# Patient Record
Sex: MALE | Race: WHITE | NOT HISPANIC OR LATINO | Employment: OTHER | URBAN - METROPOLITAN AREA
[De-identification: names, ages, dates, MRNs, and addresses within clinical notes are randomized per-mention and may not be internally consistent; named-entity substitution may affect disease eponyms.]

---

## 2017-07-11 ENCOUNTER — OFFICE VISIT (OUTPATIENT)
Dept: FAMILY MEDICINE | Facility: CLINIC | Age: 67
End: 2017-07-11
Payer: COMMERCIAL

## 2017-07-11 VITALS
RESPIRATION RATE: 20 BRPM | WEIGHT: 195 LBS | OXYGEN SATURATION: 97 % | HEART RATE: 74 BPM | SYSTOLIC BLOOD PRESSURE: 148 MMHG | TEMPERATURE: 97.9 F | DIASTOLIC BLOOD PRESSURE: 76 MMHG | BODY MASS INDEX: 27.3 KG/M2 | HEIGHT: 71 IN

## 2017-07-11 DIAGNOSIS — E78.5 HYPERLIPIDEMIA LDL GOAL <100: ICD-10-CM

## 2017-07-11 DIAGNOSIS — Z52.008 BLOOD DONOR: ICD-10-CM

## 2017-07-11 DIAGNOSIS — Z79.1 NSAID LONG-TERM USE: ICD-10-CM

## 2017-07-11 DIAGNOSIS — I10 HYPERTENSION GOAL BP (BLOOD PRESSURE) < 140/80: Primary | ICD-10-CM

## 2017-07-11 DIAGNOSIS — D50.0 IRON DEFICIENCY ANEMIA DUE TO CHRONIC BLOOD LOSS: ICD-10-CM

## 2017-07-11 DIAGNOSIS — N40.0 PROSTATE ENLARGEMENT: ICD-10-CM

## 2017-07-11 LAB
BASOPHILS # BLD AUTO: 0 10E9/L (ref 0–0.2)
BASOPHILS NFR BLD AUTO: 0.2 %
DIFFERENTIAL METHOD BLD: NORMAL
EOSINOPHIL # BLD AUTO: 0.1 10E9/L (ref 0–0.7)
EOSINOPHIL NFR BLD AUTO: 1.7 %
ERYTHROCYTE [DISTWIDTH] IN BLOOD BY AUTOMATED COUNT: 12.4 % (ref 10–15)
HCT VFR BLD AUTO: 42.7 % (ref 40–53)
HGB BLD-MCNC: 14.8 G/DL (ref 13.3–17.7)
LYMPHOCYTES # BLD AUTO: 1.5 10E9/L (ref 0.8–5.3)
LYMPHOCYTES NFR BLD AUTO: 25.2 %
MCH RBC QN AUTO: 33 PG (ref 26.5–33)
MCHC RBC AUTO-ENTMCNC: 34.7 G/DL (ref 31.5–36.5)
MCV RBC AUTO: 95 FL (ref 78–100)
MONOCYTES # BLD AUTO: 0.4 10E9/L (ref 0–1.3)
MONOCYTES NFR BLD AUTO: 6.8 %
NEUTROPHILS # BLD AUTO: 3.9 10E9/L (ref 1.6–8.3)
NEUTROPHILS NFR BLD AUTO: 66.1 %
PLATELET # BLD AUTO: 163 10E9/L (ref 150–450)
RBC # BLD AUTO: 4.48 10E12/L (ref 4.4–5.9)
WBC # BLD AUTO: 5.9 10E9/L (ref 4–11)

## 2017-07-11 PROCEDURE — 36415 COLL VENOUS BLD VENIPUNCTURE: CPT | Performed by: INTERNAL MEDICINE

## 2017-07-11 PROCEDURE — 82728 ASSAY OF FERRITIN: CPT | Performed by: INTERNAL MEDICINE

## 2017-07-11 PROCEDURE — 85025 COMPLETE CBC W/AUTO DIFF WBC: CPT | Performed by: INTERNAL MEDICINE

## 2017-07-11 PROCEDURE — 80053 COMPREHEN METABOLIC PANEL: CPT | Performed by: INTERNAL MEDICINE

## 2017-07-11 PROCEDURE — 80061 LIPID PANEL: CPT | Performed by: INTERNAL MEDICINE

## 2017-07-11 PROCEDURE — 99214 OFFICE O/P EST MOD 30 MIN: CPT | Performed by: INTERNAL MEDICINE

## 2017-07-11 RX ORDER — SIMVASTATIN 20 MG
20 TABLET ORAL AT BEDTIME
Qty: 90 TABLET | Refills: 3 | Status: SHIPPED | OUTPATIENT
Start: 2017-07-11 | End: 2018-07-10

## 2017-07-11 RX ORDER — AMLODIPINE AND BENAZEPRIL HYDROCHLORIDE 5; 20 MG/1; MG/1
1 CAPSULE ORAL DAILY
Qty: 90 CAPSULE | Refills: 3 | Status: SHIPPED | OUTPATIENT
Start: 2017-07-11 | End: 2018-06-28

## 2017-07-11 NOTE — PROGRESS NOTES
"  SUBJECTIVE:                                                    Herminio Wesley is a 66 year old male who presents to clinic today for the following health issues:      Hyperlipidemia Follow-Up      Rate your low fat/cholesterol diet?: good    Taking statin?  Yes, no muscle aches from statin    Other lipid medications/supplements?:  Fish oil/Omega 3, dose 1200 without side effects    Hypertension Follow-up      Outpatient blood pressures are being checked at home.  Results are all < 140. .    Low Salt Diet: no added salt      Amount of exercise or physical activity: golfing    Problems taking medications regularly: No    Medication side effects: none    Diet: low salt and low fat/cholesterol    Has been checking his BP over the past 2 wks.                        Re: prostate, takes ibuprofen 400 mg in the AM; to lessen prostate inflammation.              Still donates blood approx twice per year.                 Takes occas iron.                  Problem list and histories reviewed & adjusted, as indicated.      Patient Active Problem List    Diagnosis Date Noted     Amaurosis fugax of left eye 07/11/2013     Priority: High     In 2006; Carotid US mild plaque; PERNELL neg except for \"small aortic valve strands\" of uncertain significance; no further sx. He continue ASA 81 mg per day with a statin and BP meds       Hyperlipidemia LDL goal <100 07/10/2013     Priority: High     Hypertension goal BP (blood pressure) < 140/80 07/10/2013     Priority: High     Iron deficiency anemia due to chronic blood loss 07/13/2016     Priority: Medium     Blood donor 07/21/2015     Priority: Medium     On a regular basis; approx Q 3 months      (ferritin 21 in 7/15; add Fe) (14,4 and 37 in 7/16)       Prostate enlargement 07/11/2013     Priority: Medium     bx neg in 2004; Dr. Segura.          In 2009, PSA went up to 7.7, then down to 2.3, and pt decided to forgo future PSA checks.  Up to 8.31 several wks after a UTI; see Dr. Mars's " "note; then down to 6.6 in 9/13 with 8% free; he planned further f/u ; MRI of prostate \"ok\";  PSA = 8 in 7/14; continue to monitor;           7 in 4/15; see urology note; recheck in 6 months               PER PT, Down to 6 or so with NSAID Rx in 11/15/              History of UTI 07/11/2013     Priority: Medium     Advanced directives, counseling/discussion (ACP) 07/15/2014     Priority: Low     Advance Care Planning:   ACP Review and Resources Provided:  Reviewed chart for advance care plan.  Herminio Wesley has no plan or code status on file however states presence of ACP document. Copy requested. Added by Mary Osorio on 7/23/2014                 Preventive measure 07/10/2013     Priority: Low     APRIMA DATA BASE UNDER THE 7/10/13 NOTE  Colonoscopy neg in 5/14; recheck in 2019         History reviewed. No pertinent surgical history.  Social History     Social History     Marital status:      Spouse name: N/A     Number of children: N/A     Years of education: N/A     Occupational History     computers; [employer] Retired     Social History Main Topics     Smoking status: Never Smoker     Smokeless tobacco: Never Used     Alcohol use Yes      Comment: occ.     Drug use: No     Sexual activity: Yes     Partners: Female     Other Topics Concern     Parent/Sibling W/ Cabg, Mi Or Angioplasty Before 65f 55m? No     Social History Narrative         Current Outpatient Prescriptions   Medication Sig Dispense Refill     ibuprofen (ADVIL,MOTRIN) 200 MG tablet Take 400 mg by mouth every 4 hours as needed for mild pain       amLODIPine-benazepril (LOTREL) 5-20 MG per capsule Take 1 capsule by mouth daily 90 capsule 3     simvastatin (ZOCOR) 20 MG tablet Take 1 tablet (20 mg) by mouth At Bedtime 90 tablet 3     omega-3 fatty acids (FISH OIL) 1200 MG capsule Take 1 capsule by mouth daily.       Coenzyme Q10 (CO Q 10 PO) Take 1 tablet by mouth daily.       Cholecalciferol (VITAMIN D) 1000 UNITS capsule Take 1 capsule by " "mouth daily.       aspirin 81 MG tablet Take 1 tablet by mouth daily.       Allergies   Allergen Reactions     Penicillins Rash     BP Readings from Last 3 Encounters:   07/11/17 150/80   07/13/16 140/80   07/21/15 138/70    Wt Readings from Last 3 Encounters:   07/11/17 195 lb (88.5 kg)   07/13/16 192 lb (87.1 kg)   07/21/15 195 lb (88.5 kg)                    Reviewed and updated as needed this visit by clinical staff  Tobacco  Allergies  Meds  Med Hx  Surg Hx  Fam Hx  Soc Hx      Reviewed and updated as needed this visit by Provider         ROS:  CONSTITUTIONAL:NEGATIVE for fever, chills, change in weight  EYES: NEGATIVE for vision changes or irritation  RESP:NEGATIVE for significant cough or SOB  CV: NEGATIVE for chest pain, palpitations or peripheral edema  GI: NEGATIVE for hematochezia, melena, nausea and poor appetite  : negative for dysuria, hematuria    OBJECTIVE:                                                    /80 (BP Location: Left arm, Patient Position: Chair, Cuff Size: Adult Regular)  Pulse 74  Temp 97.9  F (36.6  C)  Resp 20  Ht 5' 11\" (1.803 m)  Wt 195 lb (88.5 kg)  SpO2 97%  BMI 27.2 kg/m2  Body mass index is 27.2 kg/(m^2).  GENERAL APPEARANCE: healthy, alert and no distress  NECK: no adenopathy, no asymmetry, masses, or scars and thyroid normal to palpation  RESP: lungs clear to auscultation - no rales, rhonchi or wheezes  CV: regular rates and rhythm, normal S1 S2, no S3 or S4 and no murmur, click or rub   (male): prostate 1+ BPH, no nodules    Diagnostic test results:  pending     ASSESSMENT/PLAN:                                                        ICD-10-CM    1. Hypertension goal BP (blood pressure) < 140/80 I10 amLODIPine-benazepril (LOTREL) 5-20 MG per capsule     Comprehensive metabolic panel (BMP + Alb, Alk Phos, ALT, AST, Total. Bili, TP)   2. Hyperlipidemia LDL goal <100 E78.5 Lipid Profile (Chol, Trig, HDL, LDL calc)     simvastatin (ZOCOR) 20 MG tablet   3. " Blood donor Z52.008 Ferritin     CBC with platelets and differential   4. Iron deficiency anemia due to chronic blood loss D50.0 Ferritin     CBC with platelets and differential   5. NSAID long-term use Z79.1 Comprehensive metabolic panel (BMP + Alb, Alk Phos, ALT, AST, Total. Bili, TP)   6. Prostate enlargement N40.0        Summary and implications:  We reviewed multiple issues.                    He is careful to not take ibuprofen and ASA together.                         Check labs and adjust medications as indicated.    Patient Instructions   I will let you know your lab results.   Keep checking your blood pressure.                                 Adeel Walker MD  [facility]   Results for orders placed or performed in visit on 07/11/17   Comprehensive metabolic panel (BMP + Alb, Alk Phos, ALT, AST, Total. Bili, TP)   Result Value Ref Range    Sodium 136 133 - 144 mmol/L    Potassium 4.5 3.4 - 5.3 mmol/L    Chloride 104 94 - 109 mmol/L    Carbon Dioxide 23 20 - 32 mmol/L    Anion Gap 9 3 - 14 mmol/L    Glucose 97 70 - 99 mg/dL    Urea Nitrogen 20 7 - 30 mg/dL    Creatinine 0.82 0.66 - 1.25 mg/dL    GFR Estimate >90  Non  GFR Calc   >60 mL/min/1.7m2    GFR Estimate If Black >90   GFR Calc   >60 mL/min/1.7m2    Calcium 9.3 8.5 - 10.1 mg/dL    Bilirubin Total 0.7 0.2 - 1.3 mg/dL    Albumin 4.4 3.4 - 5.0 g/dL    Protein Total 7.8 6.8 - 8.8 g/dL    Alkaline Phosphatase 60 40 - 150 U/L    ALT 29 0 - 70 U/L    AST 19 0 - 45 U/L   Lipid Profile (Chol, Trig, HDL, LDL calc)   Result Value Ref Range    Cholesterol 170 <200 mg/dL    Triglycerides 48 <150 mg/dL    HDL Cholesterol 87 >39 mg/dL    LDL Cholesterol Calculated 73 <100 mg/dL    Non HDL Cholesterol 83 <130 mg/dL   Ferritin   Result Value Ref Range    Ferritin 40 26 - 388 ng/mL   CBC with platelets and differential   Result Value Ref Range    WBC 5.9 4.0 - 11.0 10e9/L    RBC Count 4.48 4.4 - 5.9  10e12/L    Hemoglobin 14.8 13.3 - 17.7 g/dL    Hematocrit 42.7 40.0 - 53.0 %    MCV 95 78 - 100 fl    MCH 33.0 26.5 - 33.0 pg    MCHC 34.7 31.5 - 36.5 g/dL    RDW 12.4 10.0 - 15.0 %    Platelet Count 163 150 - 450 10e9/L    Diff Method Automated Method     % Neutrophils 66.1 %    % Lymphocytes 25.2 %    % Monocytes 6.8 %    % Eosinophils 1.7 %    % Basophils 0.2 %    Absolute Neutrophil 3.9 1.6 - 8.3 10e9/L    Absolute Lymphocytes 1.5 0.8 - 5.3 10e9/L    Absolute Monocytes 0.4 0.0 - 1.3 10e9/L    Absolute Eosinophils 0.1 0.0 - 0.7 10e9/L    Absolute Basophils 0.0 0.0 - 0.2 10e9/L     Letter sent.               Your lab results are normal,including the liver,kidney,glucose,bone marrow,cholesterol,and the iron level (ferritin) is better.      Keep taking the same medications,including the iron.

## 2017-07-11 NOTE — LETTER
[facility]  [address]  [address]  [place] [address] [phone]                                                                                                           Herm[place]io Wesley  [address] [address]    July 13, 2017      Dear Herminio,    The results of your recent tests were reviewed and are enclosed.         Your lab results are normal,including the liver,kidney,glucose,bone marrow,cholesterol,and the iron level (ferritin) is better.      Keep taking the same medications,including the iron.       Thank you for choosing [facility].  We appreciate the opportunity to serve you and look forward to supporting your healthcare needs in the future.    If you have any questions or concerns, please call me or my staff at [phone].      Sincerely,    Adeel Walker MD    Results for orders placed or performed in visit on 07/11/17   Comprehensive metabolic panel (BMP + Alb, Alk Phos, ALT, AST, Total. Bili, TP)   Result Value Ref Range    Sodium 136 133 - 144 mmol/L    Potassium 4.5 3.4 - 5.3 mmol/L    Chloride 104 94 - 109 mmol/L    Carbon Dioxide 23 20 - 32 mmol/L    Anion Gap 9 3 - 14 mmol/L    Glucose 97 70 - 99 mg/dL    Urea Nitrogen 20 7 - 30 mg/dL    Creatinine 0.82 0.66 - 1.25 mg/dL    GFR Estimate >90  Non  GFR Calc   >60 mL/min/1.7m2    GFR Estimate If Black >90   GFR Calc   >60 mL/min/1.7m2    Calcium 9.3 8.5 - 10.1 mg/dL    Bilirubin Total 0.7 0.2 - 1.3 mg/dL    Albumin 4.4 3.4 - 5.0 g/dL    Protein Total 7.8 6.8 - 8.8 g/dL    Alkaline Phosphatase 60 40 - 150 U/L    ALT 29 0 - 70 U/L    AST 19 0 - 45 U/L   Lipid Profile (Chol, Trig, HDL, LDL calc)   Result Value Ref Range    Cholesterol 170 <200 mg/dL    Triglycerides 48 <150 mg/dL    HDL Cholesterol 87 >39 mg/dL    LDL Cholesterol Calculated 73 <100 mg/dL    Non HDL Cholesterol 83 <130 mg/dL   Ferritin   Result Value Ref  Range    Ferritin 40 26 - 388 ng/mL   CBC with platelets and differential   Result Value Ref Range    WBC 5.9 4.0 - 11.0 10e9/L    RBC Count 4.48 4.4 - 5.9 10e12/L    Hemoglobin 14.8 13.3 - 17.7 g/dL    Hematocrit 42.7 40.0 - 53.0 %    MCV 95 78 - 100 fl    MCH 33.0 26.5 - 33.0 pg    MCHC 34.7 31.5 - 36.5 g/dL    RDW 12.4 10.0 - 15.0 %    Platelet Count 163 150 - 450 10e9/L    Diff Method Automated Method     % Neutrophils 66.1 %    % Lymphocytes 25.2 %    % Monocytes 6.8 %    % Eosinophils 1.7 %    % Basophils 0.2 %    Absolute Neutrophil 3.9 1.6 - 8.3 10e9/L    Absolute Lymphocytes 1.5 0.8 - 5.3 10e9/L    Absolute Monocytes 0.4 0.0 - 1.3 10e9/L    Absolute Eosinophils 0.1 0.0 - 0.7 10e9/L    Absolute Basophils 0.0 0.0 - 0.2 10e9/L

## 2017-07-11 NOTE — MR AVS SNAPSHOT
"              After Visit Summary   7/11/2017    Herminio Wesley    MRN: [id number]           Patient Information     Date Of Birth          1950        Visit Information        Provider Department      7/11/2017 10:15 AM Adeel Walker MD [facility]        Today's Diagnoses     Hypertension goal BP (blood pressure) < 140/80    -  1    Hyperlipidemia LDL goal <100        Blood donor        Iron deficiency anemia due to chronic blood loss        NSAID long-term use          Care Instructions    I will let you know your lab results.   Keep checking your blood pressure.                                     Follow-ups after your visit        Who to contact     If you have questions or need follow up information about today's clinic visit or your schedule please contact [facility] directly at [phone].  Normal or non-critical lab and imaging results will be communicated to you by MyChart, letter or phone within 4 business days after the clinic has received the results. If you do not hear from us within 7 days, please contact the clinic through MyChart or phone. If you have a critical or abnormal lab result, we will notify you by phone as soon as possible.  Submit refill requests through [facility] or call your pharmacy and they will forward the refill request to us. Please allow 3 business days for your refill to be completed.          Additional Information About Your Visit        Care EveryWhere ID     This is your Care EveryWhere ID. This could be used by other organizations to access your [facility] medical records  [id number]        Your Vitals Were     Pulse Temperature Respirations Height Pulse Oximetry BMI (Body Mass Index)    74 97.9  F (36.6  C) 20 5' 11\" (1.803 m) 97% 27.2 kg/m2       Blood Pressure from Last 3 Encounters:   07/11/17 148/76   07/13/16 140/80   07/21/15 138/70    Weight from Last 3 Encounters:   07/11/17 195 lb (88.5 kg) "   07/13/16 192 lb (87.1 kg)   07/21/15 195 lb (88.5 kg)              We Performed the Following     CBC with platelets and differential     Comprehensive metabolic panel (BMP + Alb, Alk Phos, ALT, AST, Total. Bili, TP)     Ferritin     Lipid Profile (Chol, Trig, HDL, LDL calc)          Where to get your medicines      These medications were sent to [facility] [address] [place] [address] [address]  [address], [place] [address]     Phone:  [phone]     amLODIPine-benazepril 5-20 MG per capsule    simvastatin 20 MG tablet          Primary Care Provider Office Phone # Fax #    Adeel Walker [phone]       [facility] [address] [facility] [address]        Equal Access to Services     DORIS ADORNO : Hadii aad ku hadasho Soomaali, waaxda luqadaha, qaybta kaalmada adeegyada, javan baezin hayaan rani shaw . So [facility] [phone].    ATENCIÓN: Si habla español, tiene a shoemaker disposición servicios gratuitos de asistencia lingüística. Espinoza al [phone].    We comply with applicable federal civil rights laws and [place] laws. We do not discriminate on the basis of race, color, national origin, age, disability sex, sexual orientation or gender identity.            Thank you!     Thank you for choosing [facility]  for your care. Our goal is always to provide you with excellent care. Hearing back from our patients is one way we can continue to improve our services. Please take a few minutes to complete the written survey that you may receive in the mail after your visit with us. Thank you!             Your Updated Medication List - Protect others around you: Learn how to safely use, store and throw away your medicines at www.disposemymeds.org.          This list is accurate as of: 7/11/17 10:59 AM.  Always use your most recent med list.                   Brand Name Dispense Instructions for use  Diagnosis    amLODIPine-benazepril 5-20 MG per capsule    LOTREL    90 capsule    Take 1 capsule by mouth daily    Hypertension goal BP (blood pressure) < 140/80       aspirin 81 MG tablet      Take 1 tablet by mouth daily.        CO Q 10 PO      Take 1 tablet by mouth daily.        ibuprofen 200 MG tablet    ADVIL/MOTRIN     Take 400 mg by mouth every 4 hours as needed for mild pain        omega-3 fatty acids 1200 MG capsule      Take 1 capsule by mouth daily.        simvastatin 20 MG tablet    ZOCOR    90 tablet    Take 1 tablet (20 mg) by mouth At Bedtime    Hyperlipidemia LDL goal <100       vitamin D 1000 UNITS capsule      Take 1 capsule by mouth daily.

## 2017-07-11 NOTE — PATIENT INSTRUCTIONS
I will let you know your lab results.   Keep checking your blood pressure.

## 2017-07-11 NOTE — NURSING NOTE
"Chief Complaint   Patient presents with     Hypertension     Lipids       Initial /80 (BP Location: Left arm, Patient Position: Chair, Cuff Size: Adult Regular)  Pulse 74  Temp 97.9  F (36.6  C)  Resp 20  Ht 5' 11\" (1.803 m)  Wt 195 lb (88.5 kg)  SpO2 97%  BMI 27.2 kg/m2 Estimated body mass index is 27.2 kg/(m^2) as calculated from the following:    Height as of this encounter: 5' 11\" (1.803 m).    Weight as of this encounter: 195 lb (88.5 kg).  Medication Reconciliation: complete   Rina Juárez LPN  "

## 2017-07-12 LAB
ALBUMIN SERPL-MCNC: 4.4 G/DL (ref 3.4–5)
ALP SERPL-CCNC: 60 U/L (ref 40–150)
ALT SERPL W P-5'-P-CCNC: 29 U/L (ref 0–70)
ANION GAP SERPL CALCULATED.3IONS-SCNC: 9 MMOL/L (ref 3–14)
AST SERPL W P-5'-P-CCNC: 19 U/L (ref 0–45)
BILIRUB SERPL-MCNC: 0.7 MG/DL (ref 0.2–1.3)
BUN SERPL-MCNC: 20 MG/DL (ref 7–30)
CALCIUM SERPL-MCNC: 9.3 MG/DL (ref 8.5–10.1)
CHLORIDE SERPL-SCNC: 104 MMOL/L (ref 94–109)
CHOLEST SERPL-MCNC: 170 MG/DL
CO2 SERPL-SCNC: 23 MMOL/L (ref 20–32)
CREAT SERPL-MCNC: 0.82 MG/DL (ref 0.66–1.25)
GFR SERPL CREATININE-BSD FRML MDRD: NORMAL ML/MIN/1.7M2
GLUCOSE SERPL-MCNC: 97 MG/DL (ref 70–99)
HDLC SERPL-MCNC: 87 MG/DL
LDLC SERPL CALC-MCNC: 73 MG/DL
NONHDLC SERPL-MCNC: 83 MG/DL
POTASSIUM SERPL-SCNC: 4.5 MMOL/L (ref 3.4–5.3)
PROT SERPL-MCNC: 7.8 G/DL (ref 6.8–8.8)
SODIUM SERPL-SCNC: 136 MMOL/L (ref 133–144)
TRIGL SERPL-MCNC: 48 MG/DL

## 2017-07-13 LAB — FERRITIN SERPL-MCNC: 40 NG/ML (ref 26–388)

## 2018-07-10 ENCOUNTER — OFFICE VISIT (OUTPATIENT)
Dept: FAMILY MEDICINE | Facility: CLINIC | Age: 68
End: 2018-07-10
Payer: COMMERCIAL

## 2018-07-10 VITALS
SYSTOLIC BLOOD PRESSURE: 136 MMHG | RESPIRATION RATE: 20 BRPM | BODY MASS INDEX: 27.3 KG/M2 | TEMPERATURE: 97.5 F | DIASTOLIC BLOOD PRESSURE: 70 MMHG | WEIGHT: 195 LBS | OXYGEN SATURATION: 99 % | HEART RATE: 78 BPM | HEIGHT: 71 IN

## 2018-07-10 DIAGNOSIS — E78.5 HYPERLIPIDEMIA LDL GOAL <100: ICD-10-CM

## 2018-07-10 DIAGNOSIS — Z52.008 BLOOD DONOR: ICD-10-CM

## 2018-07-10 DIAGNOSIS — D50.0 IRON DEFICIENCY ANEMIA DUE TO CHRONIC BLOOD LOSS: ICD-10-CM

## 2018-07-10 DIAGNOSIS — I10 HYPERTENSION GOAL BP (BLOOD PRESSURE) < 140/80: Primary | ICD-10-CM

## 2018-07-10 DIAGNOSIS — N40.0 PROSTATE ENLARGEMENT: ICD-10-CM

## 2018-07-10 DIAGNOSIS — R06.83 SNORING: ICD-10-CM

## 2018-07-10 LAB
ALBUMIN SERPL-MCNC: 4 G/DL (ref 3.4–5)
ALP SERPL-CCNC: 58 U/L (ref 40–150)
ALT SERPL W P-5'-P-CCNC: 28 U/L (ref 0–70)
ANION GAP SERPL CALCULATED.3IONS-SCNC: 8 MMOL/L (ref 3–14)
AST SERPL W P-5'-P-CCNC: 16 U/L (ref 0–45)
BASOPHILS # BLD AUTO: 0 10E9/L (ref 0–0.2)
BASOPHILS NFR BLD AUTO: 0.2 %
BILIRUB SERPL-MCNC: 0.5 MG/DL (ref 0.2–1.3)
BUN SERPL-MCNC: 19 MG/DL (ref 7–30)
CALCIUM SERPL-MCNC: 9 MG/DL (ref 8.5–10.1)
CHLORIDE SERPL-SCNC: 108 MMOL/L (ref 94–109)
CHOLEST SERPL-MCNC: 154 MG/DL
CO2 SERPL-SCNC: 23 MMOL/L (ref 20–32)
CREAT SERPL-MCNC: 0.84 MG/DL (ref 0.66–1.25)
DIFFERENTIAL METHOD BLD: ABNORMAL
EOSINOPHIL # BLD AUTO: 0.1 10E9/L (ref 0–0.7)
EOSINOPHIL NFR BLD AUTO: 2.2 %
ERYTHROCYTE [DISTWIDTH] IN BLOOD BY AUTOMATED COUNT: 12.6 % (ref 10–15)
FERRITIN SERPL-MCNC: 36 NG/ML (ref 26–388)
GFR SERPL CREATININE-BSD FRML MDRD: >90 ML/MIN/1.7M2
GLUCOSE SERPL-MCNC: 97 MG/DL (ref 70–99)
HCT VFR BLD AUTO: 41.5 % (ref 40–53)
HDLC SERPL-MCNC: 75 MG/DL
HGB BLD-MCNC: 14.4 G/DL (ref 13.3–17.7)
LDLC SERPL CALC-MCNC: 69 MG/DL
LYMPHOCYTES # BLD AUTO: 1.4 10E9/L (ref 0.8–5.3)
LYMPHOCYTES NFR BLD AUTO: 31.2 %
MCH RBC QN AUTO: 33 PG (ref 26.5–33)
MCHC RBC AUTO-ENTMCNC: 34.7 G/DL (ref 31.5–36.5)
MCV RBC AUTO: 95 FL (ref 78–100)
MONOCYTES # BLD AUTO: 0.3 10E9/L (ref 0–1.3)
MONOCYTES NFR BLD AUTO: 6.8 %
NEUTROPHILS # BLD AUTO: 2.7 10E9/L (ref 1.6–8.3)
NEUTROPHILS NFR BLD AUTO: 59.6 %
NONHDLC SERPL-MCNC: 79 MG/DL
PLATELET # BLD AUTO: 156 10E9/L (ref 150–450)
POTASSIUM SERPL-SCNC: 4.4 MMOL/L (ref 3.4–5.3)
PROT SERPL-MCNC: 7.4 G/DL (ref 6.8–8.8)
RBC # BLD AUTO: 4.36 10E12/L (ref 4.4–5.9)
SODIUM SERPL-SCNC: 139 MMOL/L (ref 133–144)
TRIGL SERPL-MCNC: 52 MG/DL
WBC # BLD AUTO: 4.6 10E9/L (ref 4–11)

## 2018-07-10 PROCEDURE — 85025 COMPLETE CBC W/AUTO DIFF WBC: CPT | Performed by: INTERNAL MEDICINE

## 2018-07-10 PROCEDURE — 80061 LIPID PANEL: CPT | Performed by: INTERNAL MEDICINE

## 2018-07-10 PROCEDURE — 80053 COMPREHEN METABOLIC PANEL: CPT | Performed by: INTERNAL MEDICINE

## 2018-07-10 PROCEDURE — 36415 COLL VENOUS BLD VENIPUNCTURE: CPT | Performed by: INTERNAL MEDICINE

## 2018-07-10 PROCEDURE — 82728 ASSAY OF FERRITIN: CPT | Performed by: INTERNAL MEDICINE

## 2018-07-10 PROCEDURE — 99214 OFFICE O/P EST MOD 30 MIN: CPT | Performed by: INTERNAL MEDICINE

## 2018-07-10 RX ORDER — AMLODIPINE AND BENAZEPRIL HYDROCHLORIDE 5; 20 MG/1; MG/1
CAPSULE ORAL
Qty: 90 CAPSULE | Refills: 4 | Status: SHIPPED | OUTPATIENT
Start: 2018-07-10 | End: 2019-10-22

## 2018-07-10 RX ORDER — SIMVASTATIN 20 MG
20 TABLET ORAL AT BEDTIME
Qty: 90 TABLET | Refills: 4 | Status: SHIPPED | OUTPATIENT
Start: 2018-07-10 | End: 2019-10-22

## 2018-07-10 NOTE — PROGRESS NOTES
SUBJECTIVE:   Herminio Wesley is a 67 year old male who presents to clinic today for the following health issues:      Hyperlipidemia Follow-Up      Rate your low fat/cholesterol diet?: good    Taking statin?  Yes, no muscle aches from statin    Other lipid medications/supplements?:  none    Hypertension Follow-up      Outpatient blood pressures are being checked at home.    Low Salt Diet: no added salt      Amount of exercise or physical activity: occ.    Problems taking medications regularly: No    Medication side effects: none    Diet: low salt and low fat/cholesterol                 Still donating blood.            Takes Fe; approx QOD.                            Ibuprofen 400 per day.       Home BP excellent.                            Feels healthy.                       Declines PSA testing;ok with digital exam.                          Wife wants a sleep study; he snores, and she has observed apnea.                      Problem list and histories reviewed & adjusted, as indicated.      Current Outpatient Prescriptions   Medication Sig Dispense Refill     amLODIPine-benazepril (LOTREL) 5-20 MG per capsule TAKE ONE CAPSULE BY MOUTH ONCE DAILY 30 capsule 0     aspirin 81 MG tablet Take 1 tablet by mouth daily.       Cholecalciferol (VITAMIN D) 1000 UNITS capsule Take 1 capsule by mouth daily.       Coenzyme Q10 (CO Q 10 PO) Take 1 tablet by mouth daily.       ibuprofen (ADVIL,MOTRIN) 200 MG tablet Take 400 mg by mouth every 4 hours as needed for mild pain               simvastatin (ZOCOR) 20 MG tablet Take 1 tablet (20 mg) by mouth At Bedtime 90 tablet 3     Allergies   Allergen Reactions     Penicillins Rash     BP Readings from Last 3 Encounters:   07/10/18 136/70   07/11/17 148/76   07/13/16 140/80    Wt Readings from Last 3 Encounters:   07/10/18 195 lb (88.5 kg)   07/11/17 195 lb (88.5 kg)   07/13/16 192 lb (87.1 kg)                    Reviewed and updated as needed this visit by clinical staff      "  Reviewed and updated as needed this visit by Provider         ROS:  CONSTITUTIONAL:NEGATIVE for fever, chills, change in weight  RESP:NEGATIVE for significant cough or SOB  CV: NEGATIVE for chest pain, palpitations or peripheral edema  : negative for dysuria, hematuria, decreased urinary stream    OBJECTIVE:                                                    /70 (BP Location: Right arm, Patient Position: Chair, Cuff Size: Adult Large)  Pulse 78  Temp 97.5  F (36.4  C)  Resp 20  Ht 5' 11\" (1.803 m)  Wt 195 lb (88.5 kg)  SpO2 99%  BMI 27.2 kg/m2  Body mass index is 27.2 kg/(m^2).  GENERAL APPEARANCE: healthy, alert and no distress  RESP: lungs clear to auscultation - no rales, rhonchi or wheezes  CV: regular rates and rhythm, normal S1 S2, no S3 or S4 and no murmur, click or rub   (male): testicles normal without atrophy or masses, penis normal without urethral discharge and mild prostate enlargement; no nodules or induration    Diagnostic test results:  pending     ASSESSMENT/PLAN:                                                        ICD-10-CM    1. Hypertension goal BP (blood pressure) < 140/80 I10 Comprehensive metabolic panel (BMP + Alb, Alk Phos, ALT, AST, Total. Bili, TP)     amLODIPine-benazepril (LOTREL) 5-20 MG per capsule   2. Hyperlipidemia LDL goal <100 E78.5 Lipid Profile (Chol, Trig, HDL, LDL calc)     simvastatin (ZOCOR) 20 MG tablet   3. Iron deficiency anemia due to chronic blood loss D50.0 Ferritin     CBC with platelets and differential   4. Blood donor Z52.008 Ferritin     CBC with platelets and differential   5. Snoring R06.83 SLEEP EVALUATION & MANAGEMENT REFERRAL - [facility] [phone]  (Age 18 and up)   6. Prostate enlargement N40.0        Summary and implications:  Multiple issues.     Check labs and adjust medications as indicated.       Same BP meds.               Patient Instructions   I will let you know your lab results.   We are " planning a sleep study.                                                                                                                                                  Consider getting the shingles vaccine (Shingrix) at your pharmacy.         Adeel Walker MD  [facility]    Results for orders placed or performed in visit on 07/10/18   Comprehensive metabolic panel (BMP + Alb, Alk Phos, ALT, AST, Total. Bili, TP)   Result Value Ref Range    Sodium 139 133 - 144 mmol/L    Potassium 4.4 3.4 - 5.3 mmol/L    Chloride 108 94 - 109 mmol/L    Carbon Dioxide 23 20 - 32 mmol/L    Anion Gap 8 3 - 14 mmol/L    Glucose 97 70 - 99 mg/dL    Urea Nitrogen 19 7 - 30 mg/dL    Creatinine 0.84 0.66 - 1.25 mg/dL    GFR Estimate >90 >60 mL/min/1.7m2    GFR Estimate If Black >90 >60 mL/min/1.7m2    Calcium 9.0 8.5 - 10.1 mg/dL    Bilirubin Total 0.5 0.2 - 1.3 mg/dL    Albumin 4.0 3.4 - 5.0 g/dL    Protein Total 7.4 6.8 - 8.8 g/dL    Alkaline Phosphatase 58 40 - 150 U/L    ALT 28 0 - 70 U/L    AST 16 0 - 45 U/L   Lipid Profile (Chol, Trig, HDL, LDL calc)   Result Value Ref Range    Cholesterol 154 <200 mg/dL    Triglycerides 52 <150 mg/dL    HDL Cholesterol 75 >39 mg/dL    LDL Cholesterol Calculated 69 <100 mg/dL    Non HDL Cholesterol 79 <130 mg/dL   Ferritin   Result Value Ref Range    Ferritin 36 26 - 388 ng/mL   CBC with platelets and differential   Result Value Ref Range    WBC 4.6 4.0 - 11.0 10e9/L    RBC Count 4.36 (L) 4.4 - 5.9 10e12/L    Hemoglobin 14.4 13.3 - 17.7 g/dL    Hematocrit 41.5 40.0 - 53.0 %    MCV 95 78 - 100 fl    MCH 33.0 26.5 - 33.0 pg    MCHC 34.7 31.5 - 36.5 g/dL    RDW 12.6 10.0 - 15.0 %    Platelet Count 156 150 - 450 10e9/L    Diff Method Automated Method     % Neutrophils 59.6 %    % Lymphocytes 31.2 %    % Monocytes 6.8 %    % Eosinophils 2.2 %    % Basophils 0.2 %    Absolute Neutrophil 2.7 1.6 - 8.3 10e9/L    Absolute Lymphocytes 1.4 0.8 - 5.3 10e9/L    Absolute Monocytes  0.3 0.0 - 1.3 10e9/L    Absolute Eosinophils 0.1 0.0 - 0.7 10e9/L    Absolute Basophils 0.0 0.0 - 0.2 10e9/L     Letter sent.      Your lab results are normal,including the liver,kidney,glucose,bone marrow,and cholesterol levels.      Keep taking the same medications.                Your iron level (ferritin) is at the lower end of the normal range.                  I suggest that you keep taking the same dose of iron, as long as you keep donating blood.

## 2018-07-10 NOTE — MR AVS SNAPSHOT
After Visit Summary   7/10/2018    Herminio Wesley    MRN: [id number]           Patient Information     Date Of Birth          1950        Visit Information        Provider Department      7/10/2018 8:00 AM Adeel Walker MD [facility]        Today's Diagnoses     Hypertension goal BP (blood pressure) < 140/80    -  1    Hyperlipidemia LDL goal <100        Iron deficiency anemia due to chronic blood loss        Blood donor        Snoring        Prostate enlargement          Care Instructions    I will let you know your lab results.   We are planning a sleep study.                                                                                                                                                  Consider getting the shingles vaccine (Shingrix) at your pharmacy.             Follow-ups after your visit        Additional Services     SLEEP EVALUATION & MANAGEMENT REFERRAL - [facility] [phone]  (Age 18 and up)       Please be aware that coverage of these services is subject to the terms and limitations of your health insurance plan.  Call member services at your health plan with any benefit or coverage questions.      Please bring the following to your appointment:    >>   List of current medications   >>   This referral request   >>   Any documents/labs given to you for this referral                      Future tests that were ordered for you today     Open Future Orders        Priority Expected Expires Ordered    SLEEP EVALUATION & MANAGEMENT REFERRAL - [facility] [phone]  (Age 18 and up) Routine  7/10/2019 7/10/2018            Who to contact     If you have questions or need follow up information about today's clinic visit or your schedule please contact [facility] directly at [phone].  Normal or non-critical lab and imaging results will be communicated  "to you by MyChart, letter or phone within 4 business days after the clinic has received the results. If you do not hear from us within 7 days, please contact the clinic through MyChart or phone. If you have a critical or abnormal lab result, we will notify you by phone as soon as possible.  Submit refill requests through [employer] or call your pharmacy and they will forward the refill request to us. Please allow 3 business days for your refill to be completed.          Additional Information About Your Visit        Care EveryWhere ID     This is your Care EveryWhere ID. This could be used by other organizations to access your [place] medical records  [id number]        Your Vitals Were     Pulse Temperature Respirations Height Pulse Oximetry BMI (Body Mass Index)    78 97.5  F (36.4  C) 20 5' 11\" (1.803 m) 99% 27.2 kg/m2       Blood Pressure from Last 3 Encounters:   07/10/18 136/70   07/11/17 148/76   07/13/16 140/80    Weight from Last 3 Encounters:   07/10/18 195 lb (88.5 kg)   07/11/17 195 lb (88.5 kg)   07/13/16 192 lb (87.1 kg)              We Performed the Following     CBC with platelets and differential     Comprehensive metabolic panel (BMP + Alb, Alk Phos, ALT, AST, Total. Bili, TP)     Ferritin     Lipid Profile (Chol, Trig, HDL, LDL calc)          Today's Medication Changes          These changes are accurate as of 7/10/18  8:34 AM.  If you have any questions, ask your nurse or doctor.               Stop taking these medicines if you haven't already. Please contact your care team if you have questions.     omega-3 fatty acids 1200 MG capsule   Stopped by:  Adeel Walker MD                Where to get your medicines      [facility] [address] [place] [address] [address] [facility]  [address] [facility] [address]     Phone:  [phone]     amLODIPine-benazepril 5-20 MG per capsule    simvastatin 20 MG tablet                Primary Care Provider " Office Phone # Fax #    Adeel Walker [phone]       [address] [facility] [address]        Equal Access to Services     DORIS ADORNO : Hadii aad ku hadsirio Soomaali, waaxda luqadaha, qaybta kaalmada adeegyada, javan humphreys laYohandacia zambrano. So [facility] [phone].    ATENCIÓN: Si habla español, tiene a shoemaker disposición servicios gratuitos de asistencia lingüística. Llame al [phone].    We comply with applicable federal civil rights laws and [place] laws. We do not discriminate on the basis of race, color, national origin, age, disability, sex, sexual orientation, or gender identity.            Thank you!     Thank you for choosing [facility]  for your care. Our goal is always to provide you with excellent care. Hearing back from our patients is one way we can continue to improve our services. Please take a few minutes to complete the written survey that you may receive in the mail after your visit with us. Thank you!             Your Updated Medication List - Protect others around you: Learn how to safely use, store and throw away your medicines at www.disposemymeds.org.          This list is accurate as of 7/10/18  8:34 AM.  Always use your most recent med list.                   Brand Name Dispense Instructions for use Diagnosis    amLODIPine-benazepril 5-20 MG per capsule    LOTREL    90 capsule    TAKE ONE CAPSULE BY MOUTH ONCE DAILY    Hypertension goal BP (blood pressure) < 140/80       aspirin 81 MG tablet      Take 1 tablet by mouth daily.        CO Q 10 PO      Take 1 tablet by mouth daily.        ibuprofen 200 MG tablet    ADVIL/MOTRIN     Take 400 mg by mouth every 4 hours as needed for mild pain        simvastatin 20 MG tablet    ZOCOR    90 tablet    Take 1 tablet (20 mg) by mouth At Bedtime    Hyperlipidemia LDL goal <100       vitamin D 1000 units capsule      Take 1 capsule by mouth daily.

## 2018-07-10 NOTE — Clinical Note
Please abstract the following data from this visit with this patient into the appropriate field in Epic:Colonoscopy done on this date: 5/22/19 (approximately), by this group: [facility], results were abnormal-repeat in 5 years.

## 2018-07-10 NOTE — PATIENT INSTRUCTIONS
I will let you know your lab results.   We are planning a sleep study.                                                                                                                                                  Consider getting the shingles vaccine (Shingrix) at your pharmacy.

## 2018-07-10 NOTE — LETTER
July 11, 2018      Herminio Wesley  [address] [facility] [address]        Dear ,    We are writing to inform you of your test results.          Your lab results are normal,including the liver,kidney,glucose,bone marrow,and cholesterol levels.      Keep taking the same medications.                Your iron level (ferritin) is at the lower end of the normal range.                  I suggest that you keep taking the same dose of iron, as long as you keep donating blood.                Resulted Orders   Comprehensive metabolic panel (BMP + Alb, Alk Phos, ALT, AST, Total. Bili, TP)   Result Value Ref Range    Sodium 139 133 - 144 mmol/L    Potassium 4.4 3.4 - 5.3 mmol/L    Chloride 108 94 - 109 mmol/L    Carbon Dioxide 23 20 - 32 mmol/L    Anion Gap 8 3 - 14 mmol/L    Glucose 97 70 - 99 mg/dL      Comment:      Fasting specimen    Urea Nitrogen 19 7 - 30 mg/dL    Creatinine 0.84 0.66 - 1.25 mg/dL    GFR Estimate >90 >60 mL/min/1.7m2      Comment:      Non  GFR Calc    GFR Estimate If Black >90 >60 mL/min/1.7m2      Comment:       GFR Calc    Calcium 9.0 8.5 - 10.1 mg/dL    Bilirubin Total 0.5 0.2 - 1.3 mg/dL    Albumin 4.0 3.4 - 5.0 g/dL    Protein Total 7.4 6.8 - 8.8 g/dL    Alkaline Phosphatase 58 40 - 150 U/L    ALT 28 0 - 70 U/L    AST 16 0 - 45 U/L   Lipid Profile (Chol, Trig, HDL, LDL calc)   Result Value Ref Range    Cholesterol 154 <200 mg/dL    Triglycerides 52 <150 mg/dL      Comment:      Fasting specimen    HDL Cholesterol 75 >39 mg/dL    LDL Cholesterol Calculated 69 <100 mg/dL      Comment:      Desirable:       <100 mg/dl    Non HDL Cholesterol 79 <130 mg/dL   Ferritin   Result Value Ref Range    Ferritin 36 26 - 388 ng/mL   CBC with platelets and differential   Result Value Ref Range    WBC 4.6 4.0 - 11.0 10e9/L    RBC Count 4.36 (L) 4.4 - 5.9 10e12/L    Hemoglobin 14.4 13.3 - 17.7 g/dL    Hematocrit 41.5 40.0 - 53.0 %    MCV 95 78 - 100 fl    MCH 33.0  26.5 - 33.0 pg    MCHC 34.7 31.5 - 36.5 g/dL    RDW 12.6 10.0 - 15.0 %    Platelet Count 156 150 - 450 10e9/L    Diff Method Automated Method     % Neutrophils 59.6 %    % Lymphocytes 31.2 %    % Monocytes 6.8 %    % Eosinophils 2.2 %    % Basophils 0.2 %    Absolute Neutrophil 2.7 1.6 - 8.3 10e9/L    Absolute Lymphocytes 1.4 0.8 - 5.3 10e9/L    Absolute Monocytes 0.3 0.0 - 1.3 10e9/L    Absolute Eosinophils 0.1 0.0 - 0.7 10e9/L    Absolute Basophils 0.0 0.0 - 0.2 10e9/L       If you have any questions or concerns, please call the clinic at the number listed above.       Sincerely,        Adeel Walker MD

## 2018-07-23 ENCOUNTER — OFFICE VISIT (OUTPATIENT)
Dept: SLEEP MEDICINE | Facility: CLINIC | Age: 68
End: 2018-07-23
Payer: COMMERCIAL

## 2018-07-23 VITALS
WEIGHT: 196.6 LBS | DIASTOLIC BLOOD PRESSURE: 88 MMHG | HEART RATE: 80 BPM | SYSTOLIC BLOOD PRESSURE: 149 MMHG | RESPIRATION RATE: 16 BRPM | OXYGEN SATURATION: 98 % | BODY MASS INDEX: 27.52 KG/M2 | HEIGHT: 71 IN

## 2018-07-23 DIAGNOSIS — R06.83 SNORING: Primary | ICD-10-CM

## 2018-07-23 DIAGNOSIS — I10 HYPERTENSION GOAL BP (BLOOD PRESSURE) < 140/80: ICD-10-CM

## 2018-07-23 DIAGNOSIS — G47.30 OBSERVED SLEEP APNEA: ICD-10-CM

## 2018-07-23 PROCEDURE — 99204 OFFICE O/P NEW MOD 45 MIN: CPT | Performed by: PHYSICIAN ASSISTANT

## 2018-07-23 NOTE — PROGRESS NOTES
Sleep Consultation:    Date on this visit: 7/23/2018    Herminio Wesley is sent by Adeel Walker for a sleep consultation regarding snoring.    Primary Physician: Adeel Walker     Herminio Wesley reports snoring for at least a couple of years and concerns for apnea for in the last month. His medical history is significant for HTN, hyperlipidemia.     Herminio goes to sleep at 10:30 PM during the week. Sometimes he may stay up as late as 1 AM if he gets interested in playing with his H[employer] radio.  He wakes up at 6:30-7:00 AM without an alarm. He falls asleep in 15 minutes.  Herminio denies difficulty falling asleep.  He wakes up 2 times a night for 5 minutes before falling back to sleep.  Herminio wakes up to go to the bathroom.  On weekends, his sleep schedule is the same.  Patient gets an average of 7.5 hours of sleep per night.     Patient does use electronics in bed and watch TV in bed (very little) and does not worry in bed about anything and read in bed.     Herminio is retired. He worked as an  at [employer].  He lives with his wife. He [place] in [place].     Herminio does snore frequently, but not all of the time. He is not sure how loudly he snores. Patient does have a regular bed partner. His wife observes his snoring when he faces her. She has not commented about his snoring when he is on his back. There is not report of gasping and snorting. He has snorted himself awake when dozing in a recliner. He does have witnessed apnea on one occasion about a month ago. He is not aware of a correlation with his alcohol intake (1-2 drinks per night). They never sleep separately.  Patient sleeps on his back and side. He denies morning dry mouth, morning headaches, morning confusion and restless legs. Herminio denies any bruxism, sleep walking, sleep talking, dream enactment, sleep paralysis, cataplexy and hypnogogic/hypnopompic hallucinations.    Herminio denies difficulty breathing through his nose,  "claustrophobia, reflux at night, heartburn and depression.      Herminio has gained 5-10 pounds in 5 years.  Patient describes themself as either a morning or night person.  He would prefer to go to sleep at 10:30 PM and wake up at 6:30 AM.  Patient's [place] Sleepiness score 8/24 inconsistent with daytime sleepiness.      Herminio naps unintentionally 4 times per week for 60 minutes, feels refreshed after naps. He dozes while watching TV or reading in the evening after supper. He denies dozing while driving.  Patient was counseled on the importance of driving while alert, to pull over if drowsy, or nap before getting into the vehicle if sleepy.  He uses 3 cups/day of coffee. Last caffeine intake is usually before noon.    Allergies:    Allergies   Allergen Reactions     Penicillins Rash       Medications:    Current Outpatient Prescriptions   Medication Sig Dispense Refill     amLODIPine-benazepril (LOTREL) 5-20 MG per capsule TAKE ONE CAPSULE BY MOUTH ONCE DAILY 90 capsule 4     aspirin 81 MG tablet Take 1 tablet by mouth daily.       Coenzyme Q10 (CO Q 10 PO) Take 1 tablet by mouth daily.       ibuprofen (ADVIL,MOTRIN) 200 MG tablet Take 400 mg by mouth every 4 hours as needed for mild pain       simvastatin (ZOCOR) 20 MG tablet Take 1 tablet (20 mg) by mouth At Bedtime 90 tablet 4       Problem List:  Patient Active Problem List    Diagnosis Date Noted     Amaurosis fugax of left eye 07/11/2013     Priority: High     In 2006; Carotid US mild plaque; PERNELL neg except for \"small aortic valve strands\" of uncertain significance; no further sx. He continue ASA 81 mg per day with a statin and BP meds       Hyperlipidemia LDL goal <100 07/10/2013     Priority: High     Hypertension goal BP (blood pressure) < 140/80 07/10/2013     Priority: High     Snoring 07/10/2018     Priority: Medium     Iron deficiency anemia due to chronic blood loss 07/13/2016     Priority: Medium     Blood donor 07/21/2015     Priority: Medium     On a " "regular basis; approx Q 3 months      (ferritin 21 in 7/15; add Fe) (14,4 and 37 in 7/16)       Advanced directives, counseling/discussion (ACP) 07/15/2014     Priority: Medium     Advance Care Planning:   ACP Review and Resources Provided:  Reviewed chart for advance care plan.  Herminio Wesley has no plan or code status on file however states presence of ACP document. Copy requested. Added by Mary Osorio on 7/23/2014                 Prostate enlargement 07/11/2013     Priority: Medium     bx neg in 2004; Dr. Segura.          In 2009, PSA went up to 7.7, then down to 2.3, and pt decided to forgo future PSA checks.  Up to 8.31 several wks after a UTI; see Dr. Mars's note; then down to 6.6 in 9/13 with 8% free; he planned further f/u ; MRI of prostate \"ok\";  PSA = 8 in 7/14; continue to monitor;           7 in 4/15; see urology note; recheck in 6 months               PER PT, Down to 6 or so with NSAID Rx in 11/15; pt is continuing NSAID Rx.              In 7/17, digital exam ok by MLO, and in 7/18           History of UTI 07/11/2013     Priority: Medium     Preventive measure 07/10/2013     Priority: Low     APRIMA DATA BASE UNDER THE 7/10/13 NOTE  Colonoscopy neg in 5/14; recheck in 2019            Past Medical/Surgical History:  No past medical history on file.  No past surgical history on file.    Social History:  Social History     Social History     Marital status:      Spouse name: N/A     Number of children: N/A     Years of education: N/A     Occupational History     computers; [employer] Retired     Social History Main Topics     Smoking status: Never Smoker     Smokeless tobacco: Never Used     Alcohol use Yes      Comment: occ.     Drug use: No     Sexual activity: Yes     Partners: Female     Other Topics Concern     Parent/Sibling W/ Cabg, Mi Or Angioplasty Before 65f 55m? No     Social History Narrative       Family History:  Family History   Problem Relation Age of Onset     Hypertension " "Mother        Review of Systems:  A complete review of systems reviewed by me is negative with the exeption of what has been mentioned in the history of present illness.  CONSTITUTIONAL: NEGATIVE for weight gain/loss, fever, chills, sweats or night sweats.  CONSTITUTIONAL:  POSITIVE for  drug allergies   EYES: NEGATIVE for changes in vision, blind spots, double vision.  ENT: NEGATIVE for ear pain, sore throat, sinus pain, post-nasal drip, runny nose, bloody nose  CARDIAC: NEGATIVE for fast heartbeats or fluttering in chest, chest pain or pressure, breathlessness when lying flat, swollen legs or swollen feet.  NEUROLOGIC: NEGATIVE headaches, weakness or numbness in the arms or legs.  DERMATOLOGIC: NEGATIVE for rashes, new moles or change in mole(s)  PULMONARY: NEGATIVE SOB at rest, SOB with activity, dry cough, productive cough, coughing up blood, wheezing or whistling when breathing.    GASTROINTESTINAL: NEGATIVE for nausea or vomitting, loose or watery stools, fat or grease in stools, constipation, abdominal pain, bowel movements black in color or blood noted.  GENITOURINARY: NEGATIVE for pain during urination, blood in urine, urinating more frequently than usual, irregular menstrual periods.  MUSCULOSKELETAL: NEGATIVE for muscle pain, bone or joint pain, swollen joints.  ENDOCRINE: NEGATIVE for increased thirst or urination, diabetes.  LYMPHATIC: NEGATIVE for swollen lymph nodes, lumps or bumps in the breasts or nipple discharge.    Physical Examination:  Vitals: /83  Pulse 80  Resp 16  Ht 1.803 m (5' 11\")  Wt 89.2 kg (196 lb 9.6 oz)  SpO2 98%  BMI 27.42 kg/m2  BMI= Body mass index is 27.42 kg/(m^2).    Neck Cir (cm): 38 cm    [place] Total Score 7/23/2018   Total score - [place] 8       PARVIZ Total Score: 1 (07/23/18 1414)    GENERAL APPEARANCE: healthy, alert, no distress and cooperative  EYES: Eyes grossly normal to inspection, PERRL, conjunctivae and sclerae normal and lids and lashes normal  HENT: " nose and mouth without ulcers or lesions, oral mucous membranes moist and oropharynx clear  NECK: no adenopathy, no asymmetry, masses, or scars, thyroid normal to palpation and trachea midline and normal to palpation  RESP: lungs clear to auscultation - no rales, rhonchi or wheezes  CV: regular rates and rhythm, normal S1 S2, no S3 or S4, no murmur, click or rub and no irregular beats  LYMPHATICS: no cervical adenopathy  MS: extremities normal- no gross deformities noted  NEURO: Normal strength and tone, mentation intact, speech normal and cranial nerves 2-12 intact  Mallampati Class: II.  Tonsillar Stage: 0  surgically removed.    Impression/Plan:    (R06.83) Snoring  (primary encounter diagnosis), (I10) Hypertension goal BP (blood pressure) < 140/80, (G47.30) Observed sleep apnea  Comment: Mr. Wesley presents because his wife has noted worsening snoring over the last couple of years and a pause in breathing during sleep on one occasion about a month ago. His wife thinks his snoring is worse when he is on his left, but that is when he is facing her. Other positive risk factors include; HTN, age >50 (67), male gender. His ESS is normal at 8/24, but he dozes for about an hour in the evening about 4 times per week.   Plan: Comprehensive Sleep Study        Split night PSG with CPAP/BiPAP titration if indicated.       Literature provided regarding sleep apnea.      He will follow up with me in approximately two weeks after his sleep study has been competed to review the results and discuss plan of care.       Polysomnography reviewed.  Obstructive sleep apnea reviewed.  Complications of untreated sleep apnea were reviewed.  45 minutes was spent during this visit, over 50% in counseling and coordination of care.   Bennett Goltz, PA-C    CC: Adeel Walker

## 2018-07-23 NOTE — PATIENT INSTRUCTIONS
"MY TREATMENT INFORMATION FOR SLEEP APNEA-  Herminio Wesley    DOCTOR : Bennett Ezra Goltz, PA-C  SLEEP C[facility] : [facility]     MY CONTACT NUMBER: [phone]    Am I having a sleep study at a sleep center?  Make sure you have an appointment for the study before you leave!    Am I having a home sleep study?  Watch this video:  [email or web address]?[email or web address]_[email or web address]  Please verify your insurance coverage with your insurance carrier    Frequently asked questions:  1. What is Obstructive Sleep Apnea (HAI)? HAI is the most common type of sleep apnea. Apnea means, \"without breath.\"  Apnea is most often caused by narrowing or collapse of the upper airway as muscles relax during sleep.   Almost everyone has occasional apneas. Most people with sleep apnea have had brief interruptions at night frequently for many years.  The severity of sleep apnea is related to how frequent and severe the events are.   2. What are the consequences of HAI? Symptoms include: feeling sleepy during the day, snoring loudly, gasping or stopping of breathing, trouble sleeping, and occasionally morning headaches or heartburn at night.  Sleepiness can be serious and even increase the risk of falling asleep while driving. Other health consequences may include development of high blood pressure and other cardiovascular disease in persons who are susceptible. Untreated HAI can contribute to heart disease, stroke and diabetes.   3. What are the treatment options? In most situations, sleep apnea is a lifelong disease that must be managed with daily therapy. Medications are not effective for sleep apnea and surgery is generally not considered until other therapies have been tried. Your treatment is your choice . Continuous Positive Airway (CPAP) works right away and is the therapy that is effective in nearly everyone. An oral device to hold your jaw forward is usually the next most reliable option. Other options " include postioning devices (to keep you off your back), weight loss, and surgery including a tongue pacing device. There is more detail about some of these options below.    Important tips for using CPAP and similar devices   Know your equipment:  CPAP is continuous positive airway pressure that prevents obstructive sleep apnea by keeping the throat from collapsing while you are sleeping. In most cases, the device is  smart  and can slowly self-adjusts if your throat collapses and keeps a record every day of how well you are treated-this information is available to you and your care team.  BPAP is bilevel positive airway pressure that keeps your throat open and also assists each breath with a pressure boost to maintain adequate breathing.  Special kinds of BPAP are used in patients who have inadequate breathing from lung or heart disease. In most cases, the device is  smart  and can slowly self-adjusts to assist breathing. Like CPAP, the device keeps a record of how well you are treated.  Your mask is your connection to the device. You get to choose what feels most comfortable and the staff will help to make sure if fits. Here: are some examples of the different masks that are available:       Key points to remember on your journey with sleep apnea:  1. Sleep study.  PAP devices often need to be adjusted during a sleep study to show that they are effective and adjusted right.  2. Good tips to remember: Try wearing just the mask during a quiet time during the day so your body adapts to wearing it. A humidifier is recommended for comfort in most cases to prevent drying of your nose and throat. Allergy medication from your provider may help you if you are having nasal congestion.  3. Getting settled-in. It takes more than one night for most of us to get used to wearing a mask. Try wearing just the mask during a quiet time during the day so your body adapts to wearing it. A humidifier is recommended for comfort in most  cases. Our team will work with you carefully on the first day and will be in contact within 4 days and again at 2 and 4 weeks for advice and remote device adjustments. Your therapy is evaluated by the device each day.   4. Use it every night. The more you are able to sleep naturally for 7-8 hours, the more likely you will have good sleep and to prevent health risks or symptoms from sleep apnea. Even if you use it 4 hours it helps. Occasionally all of us are unable to use a medical therapy, in sleep apnea, it is not dangerous to miss one night.   5. Communicate. Call our skilled team on the number provided on the first day if your visit for problems that make it difficult to wear the device. Over 2 out of 3 patients can learn to wear the device long-term with help from our team. Remember to call our team or your sleep providers if you are unable to wear the device as we may have other solutions for those who cannot adapt to mask CPAP therapy. It is recommended that you sleep your sleep provider within the first 3 months and yearly after that if you are not having problems.   Take care of your equipment. Make sure you clean your mask and tubing using directions every day and that your filter and mask are replaced as recommended or if they are not working.     BESIDES CPAP, WHAT OTHER THERAPIES ARE THERE?    Positioning Device  Positioning devices are generally used when sleep apnea is mild and only occurs on your back.This example shows a pillow that straps around the waist. It may be appropriate for those whose sleep study shows milder sleep apnea that occurs primarily when lying flat on one's back. Preliminary studies have shown benefit but effectiveness at home may need to be verified by a home sleep test. These devices are generally not covered by medical insurance.  Examples of devices that maintain sleeping on the back to prevent snoring and mild sleep apnea.    Belt type body  positioner  Http://[employer]Encover/    Electronic reminder  Http://nightshifttherapy.com/  Http://www.[employer]one!.com.au/    Oral Appliance  What is oral appliance therapy?  An oral appliance device fits on your teeth at night like a retainer used after having braces. The device is made by a specialized dentist and requires several visits over 1-2 months before a manufactured device is made to fit your teeth and is adjusted to prevent your sleep apnea. Once an oral device is working properly, snoring should be improved. A home sleep test may be recommended at that time if to determine whether the sleep apnea is adequately treated.       Some things to remember:  -Oral devices are often, but not always, covered by your medical insurance. Be sure to check with your insurance provider.   -If you are referred for oral therapy, you will be given a list of specialized dentists to consider or you may choose to visit the Web site of the American Academy of Dental Sleep Medicine  -Oral devices are less likely to work if you have severe sleep apnea or are extremely overweight.     More detailed information  An oral appliance is a small acrylic device that fits over the upper and lower teeth  (similar to a retainer or a mouth guard). This device slightly moves jaw forward, which moves the base of the tongue forward, opens the airway, improves breathing for effective treat snoring and obstructive sleep apnea in perhaps 7 out of 10 people .  The best working devices are custom-made by a dental device  after a mold is made of the teeth 1, 2, 3.  When is an oral appliance indicated?  Oral appliance therapy is recommended as a first-line treatment for patients with primary snoring, mild sleep apnea, and for patients with moderate sleep apnea who prefer appliance therapy to use of CPAP4, 5. Severity of sleep apnea is determined by sleep testing and is based on the number of respiratory events per hour of sleep.   How successful  is oral appliance therapy?  The success rate of oral appliance therapy in patients with mild sleep apnea is 75-80% while in patients with moderate sleep apnea it is 50-70%. The chance of success in patients with severe sleep apnea is 40-50%. The research also shows that oral appliances have a beneficial effect on the cardiovascular health of HAI patients at the same magnitude as CPAP therapy7.  Oral appliances should be a second-line treatment in cases of severe sleep apnea, but if not completely successful then a combination therapy utilizing CPAP plus oral appliance therapy may be effective. Oral appliances tend to be effective in a broad range of patients although studies show that the patients who have the highest success are females, younger patients, those with milder disease, and less severe obesity. 3, 6.   Finding a dentist that practices dental sleep medicine  Specific training is available through the American Academy of Dental Sleep Medicine for dentists interested in working in the field of sleep. To find a dentist who is educated in the field of sleep and the use of oral appliances, near you, visit the Web site of the American Academy of Dental Sleep Medicine.    References  1. Karin et al. Objectively measured vs self-reported compliance during oral appliance therapy for sleep-disordered breathing. Chest 2013; 144(5): 6489-7122.  2. Suresh et al. Objective measurement of compliance during oral appliance therapy for sleep-disordered breathing. Thorax 2013; 68(1): 91-96.  3. Jarod, et al. Mandibular advancement devices in 620 men and women with HAI and snoring: tolerability and predictors of treatment success. Chest 2004; 125: 0778-2210.  4. Alona, et al. Oral appliances for snoring and HAI: a review. Sleep 2006; 29: 244-262.  5. Frederick, et al. Oral appliance treatment for HAI: an update. J Clin Sleep Med 2014; 10(2): 215-227.  6. Fifi et al. Predictors of OSAH treatment  outcome. J [place] Res 2007; 86: 9477-5271.    Weight Loss:    Weight loss is a long-term strategy that may improve sleep apnea in some patients.    Weight management is a personal decision and the decision should be based on your interest and the potential benefits.  If you are interested in exploring weight loss strategies, the following discussion covers the impact on weight loss on sleep apnea and the approaches that may be successful.    Being overweight does not necessarily mean you will have health consequences.  Those who have BMI over 35 or over 27 with existing medical conditions carries greater risk.   Weight loss decreases severity of sleep apnea in most people with obesity. For those with mild obesity who have developed snoring with weight gain, even 15-30 pound weight loss can improve and occasionally eliminate sleep apnea.  Structured and life-long dietary and health habits are necessary to lose weight and keep healthier weight levels.     Though there may be significant health benefits from weight loss, long-term weight loss is very difficult to achieve- studies show success with dietary management in less than 10% of people. In addition, substantial weight loss may require years of dietary control and may be difficult if patients have severe obesity. In these cases, surgical management may be considered.  Finally, older individuals who have tolerated obesity without health complications may be less likely to benefit from weight loss strategies.      Your BMI is Body mass index is 27.42 kg/(m^2).  Weight management is a personal decision.  If you are interested in exploring weight loss strategies, the following discussion covers the approaches that may be successful. Body mass index (BMI) is one way to tell whether you are at a healthy weight, overweight, or obese. It measures your weight in relation to your height.  A BMI of 18.5 to 24.9 is in the healthy range. A person with a BMI of 25 to 29.9 is  considered overweight, and someone with a BMI of 30 or greater is considered obese. More than two-thirds of American adults are considered overweight or obese.  Being overweight or obese increases the risk for further weight gain. Excess weight may lead to heart disease and diabetes.  Creating and following plans for healthy eating and physical activity may help you improve your health.  Weight control is part of healthy lifestyle and includes exercise, emotional health, and healthy eating habits. Careful eating habits lifelong are the mainstay of weight control. Though there are significant health benefits from weight loss, long-term weight loss with diet alone may be very difficult to achieve- studies show long-term success with dietary management in less than 10% of people. Attaining a healthy weight may be especially difficult to achieve in those with severe obesity. In some cases, medications, devices and surgical management might be considered.  What can you do?  If you are overweight or obese and are interested in methods for weight loss, you should discuss this with your provider.     Consider reducing daily calorie intake by 500 calories.     Keep a food journal.     Avoiding skipping meals, consider cutting portions instead.    Diet combined with exercise helps maintain muscle while optimizing fat loss. Strength training is particularly important for building and maintaining muscle mass. Exercise helps reduce stress, increase energy, and improves fitness. Increasing exercise without diet control, however, may not burn enough calories to loose weight.       Start walking three days a week 10-20 minutes at a time    Work towards walking thirty minutes five days a week     Eventually, increase the speed of your walking for 1-2 minutes at time    In addition, we recommend that you review healthy lifestyles and methods for weight loss available through the National Institutes of Health patient information  sites:  http://win.niddk.nih.gov/publications/index.htm    And look into health and wellness programs that may be available through your health insurance provider, employer, local community center, or sofia club.    Surgery:    Surgery for obstructive sleep apnea is considered generally only when other therapies fail to work. Surgery may be discussed with you if you are having a difficult time tolerating CPAP and or when there is an abnormal structure that requires surgical correction.  Nose and throat surgeries often enlarge the airway to prevent collapse.  Most of these surgeries create pain for 1-2 weeks and up to half of the most common surgeries are not effective throughout life.  You should carefully discuss the benefits and drawbacks to surgery with your sleep provider and surgeon to determine if it is the best solution for you.   More information  Surgery for HAI is directed at areas that are responsible for narrowing or complete obstruction of the airway during sleep.  There are a wide range of procedures available to enlarge and/or stabilize the airway to prevent blockage of breathing in the three major areas where it can occur: the palate, tongue, and nasal regions.  Successful surgical treatment depends on the accurate identification of the factors responsible for obstructive sleep apnea in each person.  A personalized approach is required because there is no single treatment that works well for everyone.  Because of anatomic variation, consultation with an examination by a sleep surgeon is a critical first step in determining what surgical options are best for each patient.  In some cases, examination during sedation may be recommended in order to guide the selection of procedures.  Patients will be counseled about risks and benefits as well as the typical recovery course after surgery. Surgery is typically not a cure for a person s HAI.  However, surgery will often significantly improve one s HAI  severity (termed  success rate ).  Even in the absence of a cure, surgery will decrease the cardiovascular risk associated with OSA7; improve overall quality of life8 (sleepiness, functionality, sleep quality, etc).  Palate Procedures:  Patients with HAI often have narrowing of their airway in the region of their tonsils and uvula.  The goals of palate procedures are to widen the airway in this region as well as to help the tissues resist collapse.  Modern palate procedure techniques focus on tissue conservation and soft tissue rearrangement, rather than tissue removal.  Often the uvula is preserved in this procedure. Residual sleep apnea is common in patient after pharyngoplasty with an average reduction in sleep apnea events of 33%2.    Tongue Procedures:  ExamWhile patients are awake, the muscles that surround the throat are active and keep this region open for breathing. These muscles relax during sleep, allowing the tongue and other structures to collapse and block breathing.  There are several different tongue procedures available.  Selection of a tongue base procedure depends on characteristics seen on physical exam.  Generally, procedures are aimed at removing bulky tissues in this area or preventing the back of the tongue from falling back during sleep.  Success rates for tongue surgery range from 50-62%3.  Hypoglossal Nerve Stimulation:  Hypoglossal nerve stimulation has recently received approval from the [place] Food and Drug Administration for the treatment of obstructive sleep apnea.  This is based on research showing that the system was safe and effective in treating sleep apnea6.  Results showed that the median AHI score decreased 68%, from 29.3 to 9.0. This therapy uses an implant system that senses breathing patterns and delivers mild stimulation to airway muscles, which keeps the airway open during sleep.  The system consists of three fully implanted components: a small generator (similar in  size to a pacemaker), a breathing sensor, and a stimulation lead.  Using a small handheld remote, a patient turns the therapy on before bed and off upon awakening.    Candidates for this device must be greater than 22 years of age, have moderate to severe HAI (AHI between 20-65), BMI less than 32, have tried CPAP/oral appliance without success, and have appropriate upper airway anatomy (determined by a sleep endoscopy performed by Dr. Grijalva).  Hypoglossal Nerve Stimulation Pathway:    The sleep surgeon s office will work with the patient through the insurance prior-authorization process (including communications and appeals).    Nasal Procedures:  Nasal obstruction can interfere with nasal breathing during the day and night.  Studies have shown that relief of nasal obstruction can improve the ability of some patients to tolerate positive airway pressure therapy for obstructive sleep apnea1.  Treatment options include medications such as nasal saline, topical corticosteroid and antihistamine sprays, and oral medications such as antihistamines or decongestants. Non-surgical treatments can include external nasal dilators for selected patients. If these are not successful by themselves, surgery can improve the nasal airway either alone or in combination with these other options.  Combination Procedures:  Combination of surgical procedures and other treatments may be recommended, particularly if patients have more than one area of narrowing or persistent positional disease.  The success rate of combination surgery ranges from 66-80%2,3.    References  1. Joellen WHITLEY. The Role of the Nose in Snoring and Obstructive Sleep Apnoea: An Update.  Eur Arch Otorhinolaryngol. 2011; 268: 1365-73.  2.  Dimitris SM; Samantha JA; Debra JR; Pallanch JF; Miguel WALLACE; Abundio TERRY; Tez ALVAREZ. Surgical modifications of the upper airway for obstructive sleep apnea in adults: a systematic review and meta-analysis. SLEEP 2010;33(10):9336-0016.  Sary SILVA. Hypopharyngeal surgery in obstructive sleep apnea: an evidence-based medicine review.  Arch Otolaryngol Head Neck Surg. 2006 Feb;132(2):206-13.  3. Subhash YH1, Yuan Y, Livan NAZ. The efficacy of anatomically based multilevel surgery for obstructive sleep apnea. Otolaryngol Head Neck Surg. 2003 Oct;129(4):327-35.  4. Sary SILVA, Goldberg A. Hypopharyngeal Surgery in Obstructive Sleep Apnea: An Evidence-Based Medicine Review. Arch Otolaryngol Head Neck Surg. 2006 Feb;132(2):206-13.  5. Suzette PJ et al. Upper-Airway Stimulation for Obstructive Sleep Apnea.  N Engl J Med. 2014 Jan 9;370(2):139-49.  6. Kitty Y et al. Increased Incidence of Cardiovascular Disease in Middle-aged Men with Obstructive Sleep Apnea. Am J Respir Crit Care Med; 2002 166: 159-165  7. Esdras BROWNING et al. Studying Life Effects and Effectiveness of Palatopharyngoplasty (SLEEP) study: Subjective Outcomes of Isolated Uvulopalatopharyngoplasty. Otolaryngol Head Neck Surg. 2011; 144: 623-631.

## 2018-07-23 NOTE — NURSING NOTE
"Chief Complaint   Patient presents with     Sleep Problem     \"Snoring, possible apnea.\"       Initial /83  Pulse 80  Resp 16  Ht 1.803 m (5' 11\")  Wt 89.2 kg (196 lb 9.6 oz)  SpO2 98%  BMI 27.42 kg/m2 Estimated body mass index is 27.42 kg/(m^2) as calculated from the following:    Height as of this encounter: 1.803 m (5' 11\").    Weight as of this encounter: 89.2 kg (196 lb 9.6 oz).    Medication Reconciliation: complete     ESS 8  Neck 38cm  Ann Lamb        "

## 2018-07-23 NOTE — MR AVS SNAPSHOT
"              After Visit Summary   7/23/2018    Herminio Wesley    MRN: [id number]           Patient Information     Date Of Birth          1950        Visit Information        Provider Department      7/23/2018 2:30 PM Goltz, Bennett Ezra, PA-C [facility]        Today's Diagnoses     Snoring    -  1    Hypertension goal BP (blood pressure) < 140/80        Observed sleep apnea          Care Instructions    MY TREATMENT INFORMATION FOR SLEEP APNEA-  Herminiooz Wesley    DOCTOR : Bennett Ezra Goltz, PA-C  SLEEP CENTER : [facility]     MY CONTACT NUMBER: [phone]    Am I having a sleep study at a sleep center?  Make sure you have an appointment for the study before you leave!    Am I having a home sleep study?  Watch this video:  [email or web address]?[email or web address]_[email or web address]  Please verify your insurance coverage with your insurance carrier    Frequently asked questions:  1. What is Obstructive Sleep Apnea (HAI)? HAI is the most common type of sleep apnea. Apnea means, \"without breath.\"  Apnea is most often caused by narrowing or collapse of the upper airway as muscles relax during sleep.   Almost everyone has occasional apneas. Most people with sleep apnea have had brief interruptions at night frequently for many years.  The severity of sleep apnea is related to how frequent and severe the events are.   2. What are the consequences of HAI? Symptoms include: feeling sleepy during the day, snoring loudly, gasping or stopping of breathing, trouble sleeping, and occasionally morning headaches or heartburn at night.  Sleepiness can be serious and even increase the risk of falling asleep while driving. Other health consequences may include development of high blood pressure and other cardiovascular disease in persons who are susceptible. Untreated HAI can contribute to heart disease, stroke and diabetes.   3. What are the treatment options? In most situations, sleep " apnea is a lifelong disease that must be managed with daily therapy. Medications are not effective for sleep apnea and surgery is generally not considered until other therapies have been tried. Your treatment is your choice . Continuous Positive Airway (CPAP) works right away and is the therapy that is effective in nearly everyone. An oral device to hold your jaw forward is usually the next most reliable option. Other options include postioning devices (to keep you off your back), weight loss, and surgery including a tongue pacing device. There is more detail about some of these options below.    Important tips for using CPAP and similar devices   Know your equipment:  CPAP is continuous positive airway pressure that prevents obstructive sleep apnea by keeping the throat from collapsing while you are sleeping. In most cases, the device is  smart  and can slowly self-adjusts if your throat collapses and keeps a record every day of how well you are treated-this information is available to you and your care team.  BPAP is bilevel positive airway pressure that keeps your throat open and also assists each breath with a pressure boost to maintain adequate breathing.  Special kinds of BPAP are used in patients who have inadequate breathing from lung or heart disease. In most cases, the device is  smart  and can slowly self-adjusts to assist breathing. Like CPAP, the device keeps a record of how well you are treated.  Your mask is your connection to the device. You get to choose what feels most comfortable and the staff will help to make sure if fits. Here: are some examples of the different masks that are available:       Key points to remember on your journey with sleep apnea:  1. Sleep study.  PAP devices often need to be adjusted during a sleep study to show that they are effective and adjusted right.  2. Good tips to remember: Try wearing just the mask during a quiet time during the day so your body adapts to wearing  it. A humidifier is recommended for comfort in most cases to prevent drying of your nose and throat. Allergy medication from your provider may help you if you are having nasal congestion.  3. Getting settled-in. It takes more than one night for most of us to get used to wearing a mask. Try wearing just the mask during a quiet time during the day so your body adapts to wearing it. A humidifier is recommended for comfort in most cases. Our team will work with you carefully on the first day and will be in contact within 4 days and again at 2 and 4 weeks for advice and remote device adjustments. Your therapy is evaluated by the device each day.   4. Use it every night. The more you are able to sleep naturally for 7-8 hours, the more likely you will have good sleep and to prevent health risks or symptoms from sleep apnea. Even if you use it 4 hours it helps. Occasionally all of us are unable to use a medical therapy, in sleep apnea, it is not dangerous to miss one night.   5. Communicate. Call our skilled team on the number provided on the first day if your visit for problems that make it difficult to wear the device. Over 2 out of 3 patients can learn to wear the device long-term with help from our team. Remember to call our team or your sleep providers if you are unable to wear the device as we may have other solutions for those who cannot adapt to mask CPAP therapy. It is recommended that you sleep your sleep provider within the first 3 months and yearly after that if you are not having problems.   Take care of your equipment. Make sure you clean your mask and tubing using directions every day and that your filter and mask are replaced as recommended or if they are not working.     BESIDES CPAP, WHAT OTHER THERAPIES ARE THERE?    Positioning Device  Positioning devices are generally used when sleep apnea is mild and only occurs on your back.This example shows a pillow that straps around the waist. It may be  appropriate for those whose sleep study shows milder sleep apnea that occurs primarily when lying flat on one's back. Preliminary studies have shown benefit but effectiveness at home may need to be verified by a home sleep test. These devices are generally not covered by medical insurance.  Examples of devices that maintain sleeping on the back to prevent snoring and mild sleep apnea.    Belt type body positioner  Http://[employer].com/    Electronic reminder  Http://nightshifttherapy.com/  Http://www.[email or web address] Pellets.au/    Oral Appliance  What is oral appliance therapy?  An oral appliance device fits on your teeth at night like a retainer used after having braces. The device is made by a specialized dentist and requires several visits over 1-2 months before a manufactured device is made to fit your teeth and is adjusted to prevent your sleep apnea. Once an oral device is working properly, snoring should be improved. A home sleep test may be recommended at that time if to determine whether the sleep apnea is adequately treated.       Some things to remember:  -Oral devices are often, but not always, covered by your medical insurance. Be sure to check with your insurance provider.   -If you are referred for oral therapy, you will be given a list of specialized dentists to consider or you may choose to visit the Web site of the American Academy of Dental Sleep Medicine  -Oral devices are less likely to work if you have severe sleep apnea or are extremely overweight.     More detailed information  An oral appliance is a small acrylic device that fits over the upper and lower teeth  (similar to a retainer or a mouth guard). This device slightly moves jaw forward, which moves the base of the tongue forward, opens the airway, improves breathing for effective treat snoring and obstructive sleep apnea in perhaps 7 out of 10 people .  The best working devices are custom-made by a dental device  after a mold is made of  the teeth 1, 2, 3.  When is an oral appliance indicated?  Oral appliance therapy is recommended as a first-line treatment for patients with primary snoring, mild sleep apnea, and for patients with moderate sleep apnea who prefer appliance therapy to use of CPAP4, 5. Severity of sleep apnea is determined by sleep testing and is based on the number of respiratory events per hour of sleep.   How successful is oral appliance therapy?  The success rate of oral appliance therapy in patients with mild sleep apnea is 75-80% while in patients with moderate sleep apnea it is 50-70%. The chance of success in patients with severe sleep apnea is 40-50%. The research also shows that oral appliances have a beneficial effect on the cardiovascular health of HAI patients at the same magnitude as CPAP therapy7.  Oral appliances should be a second-line treatment in cases of severe sleep apnea, but if not completely successful then a combination therapy utilizing CPAP plus oral appliance therapy may be effective. Oral appliances tend to be effective in a broad range of patients although studies show that the patients who have the highest success are females, younger patients, those with milder disease, and less severe obesity. 3, 6.   Finding a dentist that practices dental sleep medicine  Specific training is available through the American Academy of Dental Sleep Medicine for dentists interested in working in the field of sleep. To find a dentist who is educated in the field of sleep and the use of oral appliances, near you, visit the Web site of the American Academy of Dental Sleep Medicine.    References  1. Karin, et al. Objectively measured vs self-reported compliance during oral appliance therapy for sleep-disordered breathing. Chest 2013; 144(5): 0061-6650.  2. Suresh et al. Objective measurement of compliance during oral appliance therapy for sleep-disordered breathing. Thorax 2013; 68(1): 91-96.  3. Jarod et al.  Mandibular advancement devices in 620 men and women with HAI and snoring: tolerability and predictors of treatment success. Chest 2004; 125: 1626-1128.  4. Alona et al. Oral appliances for snoring and HAI: a review. Sleep 2006; 29: 244-262.  5. Frederick et al. Oral appliance treatment for HAI: an update. J Clin Sleep Med 2014; 10(2): 215-227.  6. Fifi et al. Predictors of OSAH treatment outcome. J Dent Res 2007; 86: 8910-6576.    Weight Loss:    Weight loss is a long-term strategy that may improve sleep apnea in some patients.    Weight management is a personal decision and the decision should be based on your interest and the potential benefits.  If you are interested in exploring weight loss strategies, the following discussion covers the impact on weight loss on sleep apnea and the approaches that may be successful.    Being overweight does not necessarily mean you will have health consequences.  Those who have BMI over 35 or over 27 with existing medical conditions carries greater risk.   Weight loss decreases severity of sleep apnea in most people with obesity. For those with mild obesity who have developed snoring with weight gain, even 15-30 pound weight loss can improve and occasionally eliminate sleep apnea.  Structured and life-long dietary and health habits are necessary to lose weight and keep healthier weight levels.     Though there may be significant health benefits from weight loss, long-term weight loss is very difficult to achieve- studies show success with dietary management in less than 10% of people. In addition, substantial weight loss may require years of dietary control and may be difficult if patients have severe obesity. In these cases, surgical management may be considered.  Finally, older individuals who have tolerated obesity without health complications may be less likely to benefit from weight loss strategies.      Your BMI is Body mass index is 27.42 kg/(m^2).  Weight  management is a personal decision.  If you are interested in exploring weight loss strategies, the following discussion covers the approaches that may be successful. Body mass index (BMI) is one way to tell whether you are at a healthy weight, overweight, or obese. It measures your weight in relation to your height.  A BMI of 18.5 to 24.9 is in the healthy range. A person with a BMI of 25 to 29.9 is considered overweight, and someone with a BMI of 30 or greater is considered obese. More than two-thirds of American adults are considered overweight or obese.  Being overweight or obese increases the risk for further weight gain. Excess weight may lead to heart disease and diabetes.  Creating and following plans for healthy eating and physical activity may help you improve your health.  Weight control is part of healthy lifestyle and includes exercise, emotional health, and healthy eating habits. Careful eating habits lifelong are the mainstay of weight control. Though there are significant health benefits from weight loss, long-term weight loss with diet alone may be very difficult to achieve- studies show long-term success with dietary management in less than 10% of people. Attaining a healthy weight may be especially difficult to achieve in those with severe obesity. In some cases, medications, devices and surgical management might be considered.  What can you do?  If you are overweight or obese and are interested in methods for weight loss, you should discuss this with your provider.     Consider reducing daily calorie intake by 500 calories.     Keep a food journal.     Avoiding skipping meals, consider cutting portions instead.    Diet combined with exercise helps maintain muscle while optimizing fat loss. Strength training is particularly important for building and maintaining muscle mass. Exercise helps reduce stress, increase energy, and improves fitness. Increasing exercise without diet control, however, may  not burn enough calories to loose weight.       Start walking three days a week 10-20 minutes at a time    Work towards walking thirty minutes five days a week     Eventually, increase the speed of your walking for 1-2 minutes at time    In addition, we recommend that you review healthy lifestyles and methods for weight loss available through the National Institutes of Health patient information sites:  http://win.niddk.nih.gov/publications/index.htm    And look into health and wellness programs that may be available through your health insurance provider, employer, local community center, or sofia club.    Surgery:    Surgery for obstructive sleep apnea is considered generally only when other therapies fail to work. Surgery may be discussed with you if you are having a difficult time tolerating CPAP and or when there is an abnormal structure that requires surgical correction.  Nose and throat surgeries often enlarge the airway to prevent collapse.  Most of these surgeries create pain for 1-2 weeks and up to half of the most common surgeries are not effective throughout life.  You should carefully discuss the benefits and drawbacks to surgery with your sleep provider and surgeon to determine if it is the best solution for you.   More information  Surgery for HAI is directed at areas that are responsible for narrowing or complete obstruction of the airway during sleep.  There are a wide range of procedures available to enlarge and/or stabilize the airway to prevent blockage of breathing in the three major areas where it can occur: the palate, tongue, and nasal regions.  Successful surgical treatment depends on the accurate identification of the factors responsible for obstructive sleep apnea in each person.  A personalized approach is required because there is no single treatment that works well for everyone.  Because of anatomic variation, consultation with an examination by a sleep surgeon is a critical first step  in determining what surgical options are best for each patient.  In some cases, examination during sedation may be recommended in order to guide the selection of procedures.  Patients will be counseled about risks and benefits as well as the typical recovery course after surgery. Surgery is typically not a cure for a person s HAI.  However, surgery will often significantly improve one s HAI severity (termed  success rate ).  Even in the absence of a cure, surgery will decrease the cardiovascular risk associated with OSA7; improve overall quality of life8 (sleepiness, functionality, sleep quality, etc).  Palate Procedures:  Patients with HAI often have narrowing of their airway in the region of their tonsils and uvula.  The goals of palate procedures are to widen the airway in this region as well as to help the tissues resist collapse.  Modern palate procedure techniques focus on tissue conservation and soft tissue rearrangement, rather than tissue removal.  Often the uvula is preserved in this procedure. Residual sleep apnea is common in patient after pharyngoplasty with an average reduction in sleep apnea events of 33%2.    Tongue Procedures:  ExamWhile patients are awake, the muscles that surround the throat are active and keep this region open for breathing. These muscles relax during sleep, allowing the tongue and other structures to collapse and block breathing.  There are several different tongue procedures available.  Selection of a tongue base procedure depends on characteristics seen on physical exam.  Generally, procedures are aimed at removing bulky tissues in this area or preventing the back of the tongue from falling back during sleep.  Success rates for tongue surgery range from 50-62%3.  Hypoglossal Nerve Stimulation:  Hypoglossal nerve stimulation has recently received approval from the [place] Food and Drug Administration for the treatment of obstructive sleep apnea.  This is based on research  showing that the system was safe and effective in treating sleep apnea6.  Results showed that the median AHI score decreased 68%, from 29.3 to 9.0. This therapy uses an implant system that senses breathing patterns and delivers mild stimulation to airway muscles, which keeps the airway open during sleep.  The system consists of three fully implanted components: a small generator (similar in size to a pacemaker), a breathing sensor, and a stimulation lead.  Using a small handheld remote, a patient turns the therapy on before bed and off upon awakening.    Candidates for this device must be greater than 22 years of age, have moderate to severe HAI (AHI between 20-65), BMI less than 32, have tried CPAP/oral appliance without success, and have appropriate upper airway anatomy (determined by a sleep endoscopy performed by Dr. Grijalva).  Hypoglossal Nerve Stimulation Pathway:    The sleep surgeon s office will work with the patient through the insurance prior-authorization process (including communications and appeals).    Nasal Procedures:  Nasal obstruction can interfere with nasal breathing during the day and night.  Studies have shown that relief of nasal obstruction can improve the ability of some patients to tolerate positive airway pressure therapy for obstructive sleep apnea1.  Treatment options include medications such as nasal saline, topical corticosteroid and antihistamine sprays, and oral medications such as antihistamines or decongestants. Non-surgical treatments can include external nasal dilators for selected patients. If these are not successful by themselves, surgery can improve the nasal airway either alone or in combination with these other options.  Combination Procedures:  Combination of surgical procedures and other treatments may be recommended, particularly if patients have more than one area of narrowing or persistent positional disease.  The success rate of combination surgery ranges from  66-80%2,3.    References  1. Joeleln WHITLEY. The Role of the Nose in Snoring and Obstructive Sleep Apnoea: An Update.  Eur Arch Otorhinolaryngol. 2011; 268: 1365-73.  2.  Dimitris SM; Samantha JA; Debra JR; Pallanch JF; Miguel MB; Abundio SG; Tez ALVAREZ. Surgical modifications of the upper airway for obstructive sleep apnea in adults: a systematic review and meta-analysis. SLEEP 2010;33(10):1581-2573. Sary SILVA. Hypopharyngeal surgery in obstructive sleep apnea: an evidence-based medicine review.  Arch Otolaryngol Head Neck Surg. 2006 Feb;132(2):206-13.  3. Subhash YH1, Yuan Y, Livan NAZ. The efficacy of anatomically based multilevel surgery for obstructive sleep apnea. Otolaryngol Head Neck Surg. 2003 Oct;129(4):327-35.  4. Sary SILVA, Goldberg A. Hypopharyngeal Surgery in Obstructive Sleep Apnea: An Evidence-Based Medicine Review. Arch Otolaryngol Head Neck Surg. 2006 Feb;132(2):206-13.  5. Strohitesho PJ et al. Upper-Airway Stimulation for Obstructive Sleep Apnea.  N Engl J Med. 2014 Jan 9;370(2):139-49.  6. Kitty Y et al. Increased Incidence of Cardiovascular Disease in Middle-aged Men with Obstructive Sleep Apnea. Am J Respir Crit Care Med; 2002 166: 159-165  7. Dewitt EM et al. Studying Life Effects and Effectiveness of Palatopharyngoplasty (SLEEP) study: Subjective Outcomes of Isolated Uvulopalatopharyngoplasty. Otolaryngol Head Neck Surg. 2011; 144: 623-631.            Follow-ups after your visit        Follow-up notes from your care team     Return in about 2 weeks (around 8/6/2018) for Sleep Study Review.      Your next 10 appointments already scheduled     Sep 17, 2018  8:30 PM CDT   PSG Split with BED 4  SLEEP   [facility] ([facility])    [address] [address]   [phone]            Oct 01, 2018  2:00 PM CDT   Return Sleep Patient with Bennett Ezra Goltz, PA-C   [facility] ([facility] [place])    [address]  "[facility] [address]  [facility] [id number] [phone]              Future tests that were ordered for you today     Open Future Orders        Priority Expected Expires Ordered    Comprehensive Sleep Study Routine  1/19/2019 7/23/2018            Who to contact     If you have questions or need follow up information about today's clinic visit or your schedule please contact [facility] directly at [phone].  Normal or non-critical lab and imaging results will be communicated to you by MyChart, letter or phone within 4 business days after the clinic has received the results. If you do not hear from us within 7 days, please contact the clinic through MyChart or phone. If you have a critical or abnormal lab result, we will notify you by phone as soon as possible.  Submit refill requests through [employer]) or call your pharmacy and they will forward the refill request to us. Please allow 3 business days for your refill to be completed.          Additional Information About Your Visit        Care EveryWhere ID     This is your Care EveryWhere ID. This could be used by other organizations to access your [place] medical records  [id number]        Your Vitals Were     Pulse Respirations Height Pulse Oximetry BMI (Body Mass Index)       80 16 1.803 m (5' 11\") 98% 27.42 kg/m2        Blood Pressure from Last 3 Encounters:   07/23/18 149/88   07/10/18 136/70   07/11/17 148/76    Weight from Last 3 Encounters:   07/23/18 89.2 kg (196 lb 9.6 oz)   07/10/18 88.5 kg (195 lb)   07/11/17 88.5 kg (195 lb)              We Performed the Following     SLE[place]AL[place] & MANAG[place] [phone]  (Age 18 and up)        Primary Care Provider Office Phone # Fax #    Adeel Walker [phone]       [address]  [facility] [address]        Equal Access to Services     DORIS ADORNO AH: Hadii rosemary tyler hadasho Soomaali, waaxda luqadaha, qaybta " javan laboy petr shaw ah. So [facility] [phone].    ATENCIÓN: Si ehsan davis, tiene a shoemaker disposición servicios gratuitos de asistencia lingüística. Espinoza al [phone].    We comply with applicable federal civil rights laws and [place] laws. We do not discriminate on the basis of race, color, national origin, age, disability, sex, sexual orientation, or gender identity.            Thank you!     Thank you for choosing [place] [facility]  for your care. Our goal is always to provide you with excellent care. Hearing back from our patients is one way we can continue to improve our services. Please take a few minutes to complete the written survey that you may receive in the mail after your visit with us. Thank you!             Your Updated Medication List - Protect others around you: Learn how to safely use, store and throw away your medicines at www.disposemymeds.org.          This list is accurate as of 7/23/18  3:37 PM.  Always use your most recent med list.                   Brand Name Dispense Instructions for use Diagnosis    amLODIPine-benazepril 5-20 MG per capsule    LOTREL    90 capsule    TAKE ONE CAPSULE BY MOUTH ONCE DAILY    Hypertension goal BP (blood pressure) < 140/80       aspirin 81 MG tablet      Take 1 tablet by mouth daily.        CO Q 10 PO      Take 1 tablet by mouth daily.        ibuprofen 200 MG tablet    ADVIL/MOTRIN     Take 400 mg by mouth every 4 hours as needed for mild pain        simvastatin 20 MG tablet    ZOCOR    90 tablet    Take 1 tablet (20 mg) by mouth At Bedtime    Hyperlipidemia LDL goal <100

## 2018-09-17 ENCOUNTER — THERAPY VISIT (OUTPATIENT)
Dept: SLEEP MEDICINE | Facility: CLINIC | Age: 68
End: 2018-09-17
Payer: COMMERCIAL

## 2018-09-17 DIAGNOSIS — R06.83 SNORING: ICD-10-CM

## 2018-09-17 DIAGNOSIS — G47.30 OBSERVED SLEEP APNEA: ICD-10-CM

## 2018-09-17 DIAGNOSIS — I10 HYPERTENSION GOAL BP (BLOOD PRESSURE) < 140/80: ICD-10-CM

## 2018-09-17 PROCEDURE — 95810 POLYSOM 6/> YRS 4/> PARAM: CPT | Performed by: PSYCHIATRY & NEUROLOGY

## 2018-09-17 NOTE — MR AVS SNAPSHOT
After Visit Summary   9/17/2018    Herminio Wesley    MRN: [id number]           Patient Information     Date Of Birth          1950        Visit Information        Provider Department      9/17/2018 8:30 PM BED 4 SH SLEEP [facility]        Today's Diagnoses     Snoring        Hypertension goal BP (blood pressure) < 140/80        Observed sleep apnea           Follow-ups after your visit        Your next 10 appointments already scheduled     Oct 15, 2018  2:00 PM CDT   Return Sleep Patient with Bennett Ezra Goltz, PA-C   [facility] ([facility])    [address] [address] [phone]              Who to contact     If you have questions or need follow up information about today's clinic visit or your schedule please contact [facility] directly at [phone].  Normal or non-critical lab and imaging results will be communicated to you by MyChart, letter or phone within 4 business days after the clinic has received the results. If you do not hear from us within 7 days, please contact the clinic through MyChart or phone. If you have a critical or abnormal lab result, we will notify you by phone as soon as possible.  Submit refill requests through [employer] system or call your pharmacy and they will forward the refill request to us. Please allow 3 business days for your refill to be completed.          Additional Information About Your Visit        Care EveryWhere ID     This is your Care EveryWhere ID. This could be used by other organizations to access your [place] medical records  [id number]         Blood Pressure from Last 3 Encounters:   07/23/18 149/88   07/10/18 136/70   07/11/17 148/76    Weight from Last 3 Encounters:   07/23/18 89.2 kg (196 lb 9.6 oz)   07/10/18 88.5 kg (195 lb)   07/11/17 88.5 kg (195 lb)              We Performed the Following     Comprehensive Sleep Study        Primary  Care Provider Office Phone # Fax #    Adeel Walker [phone] [address]  [facility] [address]        Equal Access to Services     DORIS ADORNO : Hadii aad ku hadsirio Soomaali, waaxda luqadaha, qaybta kaalmada adeegyada, javan lombardon rani humphreys laYohandacia zambrano. So [facility] [phone].    ATENCIÓN: Si habla español, tiene a shoemaker disposición servicios gratuitos de asistencia lingüística. Llame al [phone].    We comply with applicable federal civil rights laws and [place] laws. We do not discriminate on the basis of race, color, national origin, age, disability, sex, sexual orientation, or gender identity.            Thank you!     Thank you for choosing [place] [facility]  for your care. Our goal is always to provide you with excellent care. Hearing back from our patients is one way we can continue to improve our services. Please take a few minutes to complete the written survey that you may receive in the mail after your visit with us. Thank you!             Your Updated Medication List - Protect others around you: Learn how to safely use, store and throw away your medicines at www.disposemymeds.org.          This list is accurate as of 9/17/18 11:59 PM.  Always use your most recent med list.                   Brand Name Dispense Instructions for use Diagnosis    amLODIPine-benazepril 5-20 MG per capsule    LOTREL    90 capsule    TAKE ONE CAPSULE BY MOUTH ONCE DAILY    Hypertension goal BP (blood pressure) < 140/80       aspirin 81 MG tablet      Take 1 tablet by mouth daily.        CO Q 10 PO      Take 1 tablet by mouth daily.        ibuprofen 200 MG tablet    ADVIL/MOTRIN     Take 400 mg by mouth every 4 hours as needed for mild pain        simvastatin 20 MG tablet    ZOCOR    90 tablet    Take 1 tablet (20 mg) by mouth At Bedtime    Hyperlipidemia LDL goal <100

## 2018-09-20 LAB — SLPCOMP: NORMAL

## 2018-09-20 NOTE — PROCEDURES
" SLEEP STUDY INTERPRETATION  DIAGNOSTIC POLYSOMNOGRAPHY REPORT      Patient: RICCARDO AMADOR  YOB: 1950  Study Date: 9/17/2018  MRN: [id number]  Referring Provider: MD Walker Mark  Ordering Provider: Goltz, PA-C, Bennett    Indications for Polysomnography: The patient is a 67 y old Male who is 5' 11\" and weighs 196.0 lbs. His BMI is 27.4, [place] sleepiness scale 8.0 and neck circumference is 38.0 cm. Relevant medical history includes snoring, witnessed apneas. A diagnostic polysomnogram was performed to evaluate for sleep apnea.    Polysomnogram Data: A full night polysomnogram recorded the standard physiologic parameters including EEG, EOG, EMG, ECG, nasal and oral airflow. Respiratory parameters of chest and abdominal movements were recorded with respiratory inductance plethysmography. Oxygen saturation was recorded by pulse oximetry. Hypopnea scoring rule used: 1B 4%.    Sleep Architecture: Sleep fragmentation  The total recording time of the polysomnogram was 423.6 minutes. The total sleep time was 321.0 minutes. Sleep latency was 23.5 minutes. REM latency was 151.5 minutes. Arousal index was 42.4 arousals per hour. Sleep efficiency was 75.8%. Wake after sleep onset was 70.5 minutes. The patient spent 18.5% of total sleep time in Stage N1, 60.7% in Stage N2, 4.4% in Stage N3, and 16.4% in REM. Time in REM supine was 4.5 minutes.    Respiration: Mild HAI    Events ? The polysomnogram revealed a presence of 35 obstructive, 6 central, and - mixed apneas resulting in an apnea index of 7.7 events per hour. There were 15 obstructive hypopneas and - central hypopneas resulting in an obstructive hypopnea index of 2.8 and central hypopnea index of - events per hour. The combined apnea/hypopnea index was 10.5 events per hour (central apnea/hypopnea index was 1.1 events per hour). The REM AHI was 19.4 events per hour. The supine AHI was 16.0 events per hour. The RERA index was 20.4 events per hour.  The RDI " was 30.8 events per hour.    Snoring - was reported as mild-moderate.    Respiratory rate and pattern - was notable for normal respiratory rate and pattern.    Sustained Sleep Associated Hypoventilation - Transcutaneous carbon dioxide monitoring was not used.    Sleep Associated Hypoxemia - (Greater than 5 minutes O2 sat at or below 88%) was not present. Baseline oxygen saturation was 95.3%. Lowest oxygen saturation was 87.4%. Time spent less than or equal to 88% was 0 minutes. Time spent less than or equal to 89% was 0.2 minutes.    Movement Activity:     Periodic Limb Activity - There were 79 PLMs during the entire study. The PLM index was 14.8 movements per hour. The PLM Arousal Index was 2.2 per hour.    REM EMG Activity - Excessive muscle activity was not clearly present in the absence of sleep disordered breathing.    Nocturnal Behavior - Abnormal sleep related behaviors were not noted.    Bruxism - None apparent.    Cardiac Summary: Sinus  The average pulse rate was 60.1 bpm. The minimum pulse rate was 49.9 bpm while the maximum pulse rate was 92.1 bpm.      Assessment:     Mild HAI    Recommendations:    If deemed clinically relevant Mild HAI can be treated with the following options:  o Dental Appliance  o Auto CPAP  o Upper Airway Surgery  o Position restriction device (such as a ZZOMA) to prevent the patient from sleeping supine    Advice regarding the risks of drowsy driving.    Suggest optimizing sleep schedule and avoiding sleep deprivation.    Diagnostic Code(s): G47.33, G47.9      Huseyin Steele MD 9-

## 2018-10-15 ENCOUNTER — OFFICE VISIT (OUTPATIENT)
Dept: SLEEP MEDICINE | Facility: CLINIC | Age: 68
End: 2018-10-15
Payer: COMMERCIAL

## 2018-10-15 VITALS
TEMPERATURE: 97.4 F | DIASTOLIC BLOOD PRESSURE: 96 MMHG | RESPIRATION RATE: 16 BRPM | WEIGHT: 196.8 LBS | HEART RATE: 99 BPM | SYSTOLIC BLOOD PRESSURE: 143 MMHG | OXYGEN SATURATION: 97 % | BODY MASS INDEX: 27.55 KG/M2 | HEIGHT: 71 IN

## 2018-10-15 DIAGNOSIS — I10 HYPERTENSION GOAL BP (BLOOD PRESSURE) < 140/80: ICD-10-CM

## 2018-10-15 DIAGNOSIS — G47.33 OSA (OBSTRUCTIVE SLEEP APNEA): Primary | ICD-10-CM

## 2018-10-15 PROCEDURE — 99214 OFFICE O/P EST MOD 30 MIN: CPT | Performed by: PHYSICIAN ASSISTANT

## 2018-10-15 NOTE — PROGRESS NOTES
"Sleep Study Follow-Up Visit:    Date on this visit: 10/15/2018    Herminio Wesley comes in today for follow-up of his sleep study done on 9/17/2018 at the [facility] for snoring for at least a couple of years and concerns for apnea for in the last month. His medical history is significant for HTN, hyperlipidemia.    Sleep latency 23.5 minutes without Ambien.  REM achieved.   REM latency 151.5 minutes.  Sleep efficiency 75.8%. Total sleep time 321 minutes.    Sleep architecture:  Stage 1, 18.5% (5%), stage 2, 60.7% (45-55%), stage 3, 4.4% (15-20%), stage REM, 16.4% (20-25%).  AHI was 10.5/hr, with desaturations down to 88%. He spent 0.2 minutes below 90% SpO2. RDI 30.8/hr.  REM RDI 29.7/hr, consistent with moderate to severe REM HAI.  Supine RDI 42.6/hr, consistent with severe SUPINE HAI. His non-supine RDI was 25.4/hr.  Periodic Limb Movement Index 14.8/hr, 2.2/hour were associated with arousals.       CPAP titration: CPAP was not initiated. These findings were reviewed with the patient.    Past medical/surgical history, family history, social history, medications and allergies were reviewed.      Problem List:  Patient Active Problem List    Diagnosis Date Noted     Amaurosis fugax of left eye 07/11/2013     Priority: High     In 2006; Carotid US mild plaque; PERNELL neg except for \"small aortic valve strands\" of uncertain significance; no further sx. He continue ASA 81 mg per day with a statin and BP meds       Hyperlipidemia LDL goal <100 07/10/2013     Priority: High     Hypertension goal BP (blood pressure) < 140/80 07/10/2013     Priority: High     Snoring 07/10/2018     Priority: Medium     Iron deficiency anemia due to chronic blood loss 07/13/2016     Priority: Medium     Blood donor 07/21/2015     Priority: Medium     On a regular basis; approx Q 3 months      (ferritin 21 in 7/15; add Fe) (14,4 and 37 in 7/16)       Advanced directives, counseling/discussion (ACP) 07/15/2014     Priority: " "Medium     Advance Care Planning:   ACP Review and Resources Provided:  Reviewed chart for advance care plan.  Herminio Wesley has no plan or code status on file however states presence of ACP document. Copy requested. Added by Mary Osorio on 7/23/2014                 Prostate enlargement 07/11/2013     Priority: Medium     bx neg in 2004; Dr. Segura.          In 2009, PSA went up to 7.7, then down to 2.3, and pt decided to forgo future PSA checks.  Up to 8.31 several wks after a UTI; see Dr. Mars's note; then down to 6.6 in 9/13 with 8% free; he planned further f/u ; MRI of prostate \"ok\";  PSA = 8 in 7/14; continue to monitor;           7 in 4/15; see urology note; recheck in 6 months               PER PT, Down to 6 or so with NSAID Rx in 11/15; pt is continuing NSAID Rx.              In 7/17, digital exam ok by MLO, and in 7/18           History of UTI 07/11/2013     Priority: Medium     Preventive measure 07/10/2013     Priority: Low     APRIMA DATA BASE UNDER THE 7/10/13 NOTE  Colonoscopy neg in 5/14; recheck in 2019            Impression/Plan:    (G47.33) HAI (obstructive sleep apnea)  (primary encounter diagnosis), (I10) Hypertension goal BP (blood pressure) < 140/80  Comment: Mild apnea AHI 10/hr but his RDI was 30/hr.   Plan: SLEEP DENTAL REFERRAL       We talked about treatment options including CPAP and dental appliance. Positional restriction would not be effective.  Dental referral provided. He will call if he decides he would like to try CPAP instead.      He will follow up with me in about 2-3 month(s).     Twenty-five minutes spent with patient, all of which were spent face-to-face counseling, consulting, coordinating plan of care.      Bennett Goltz, PA-C    CC: Adeel Walker MD       "

## 2018-10-15 NOTE — MR AVS SNAPSHOT
After Visit Summary   10/15/2018    Herminio Wesley    MRN: [id number]           Patient Information     Date Of Birth          1950        Visit Information        Provider Department      10/15/2018 2:00 PM Goltz, Bennett Ezra, PA-C [facility]        Today's Diagnoses     HAI (obstructive sleep apnea)    -  1    Hypertension goal BP (blood pressure) < 140/80          Care Instructions      Your BMI is Body mass index is 27.45 kg/(m^2).  Weight management is a personal decision.  If you are interested in exploring weight loss strategies, the following discussion covers the approaches that may be successful. Body mass index (BMI) is one way to tell whether you are at a healthy weight, overweight, or obese. It measures your weight in relation to your height.  A BMI of 18.5 to 24.9 is in the healthy range. A person with a BMI of 25 to 29.9 is considered overweight, and someone with a BMI of 30 or greater is considered obese. More than two-thirds of American adults are considered overweight or obese.  Being overweight or obese increases the risk for further weight gain. Excess weight may lead to heart disease and diabetes.  Creating and following plans for healthy eating and physical activity may help you improve your health.  Weight control is part of healthy lifestyle and includes exercise, emotional health, and healthy eating habits. Careful eating habits lifelong are the mainstay of weight control. Though there are significant health benefits from weight loss, long-term weight loss with diet alone may be very difficult to achieve- studies show long-term success with dietary management in less than 10% of people. Attaining a healthy weight may be especially difficult to achieve in those with severe obesity. In some cases, medications, devices and surgical management might be considered.  What can you do?  If you are overweight or obese and are interested in methods for weight loss,  you should discuss this with your provider.     Consider reducing daily calorie intake by 500 calories.     Keep a food journal.     Avoiding skipping meals, consider cutting portions instead.    Diet combined with exercise helps maintain muscle while optimizing fat loss. Strength training is particularly important for building and maintaining muscle mass. Exercise helps reduce stress, increase energy, and improves fitness. Increasing exercise without diet control, however, may not burn enough calories to loose weight.       Start walking three days a week 10-20 minutes at a time    Work towards walking thirty minutes five days a week     Eventually, increase the speed of your walking for 1-2 minutes at time    In addition, we recommend that you review healthy lifestyles and methods for weight loss available through the National Institutes of Health patient information sites:  http://win.niddk.nih.gov/publications/index.htm    And look into health and wellness programs that may be available through your health insurance provider, employer, local community center, or sofia club.    Weight management plan: Patient was referred to their PCP to discuss a diet and exercise plan.            Follow-ups after your visit        Additional Services     SLEEP DENTAL REFERRAL       Dental appliance resources recommended by [facility]     Below is a list of Medicare-approved dental appliance resources recommended by [facility].  If you wish to choose your own dental sleep dentist, you may identify a provider close to you: [email or web address]    [facility]   Office 1   Charli Hill DDS - [DME Medicare]  [address], [address]   [place] [address]  Appointments: [phone]  Fax: [phone]  Website: [employer]    [facility]   Office 2  Charli Hill DDS - [DME Medicare]  [address]  [place] [address]  Appointments: [phone]  Fax: [phone]  Website: [email or web address]    [facility]   Wood Office   Sheldon Lane DDS, MS - [DME Medicare]  [facility]   [address]   [place] [address]   Appointments: [phone]   Fax: [phone]   Website: [employer]     Janusz Zabala, DMD, MSD - [DME Medicare]  [address]  [place] [address]  Appointments [phone]  Fax: [phone]  Website: [employer]    If you wish to choose your own dental sleep dentist, you may identify a provider close to you: [email or web address]      AHI: 10.5/hr PSG DATE: 9/17/2018     Return to clinic in 3 months for Home Sleep Test to confirm adequacy of Treatment.    Other information regarding this referral: Mandibular repositioning appliance for HAI. If your insurance asks for a CPT code, it is .    Please be aware that coverage of these services is subject to the terms and limitations of your health insurance plan.  Call member services at your health plan with any benefit or coverage questions.      Please bring the following to your appointment:    >>   List of current medications   >>   This referral request   >>   Any documents/labs given to you for this referral                  Follow-up notes from your care team     Return in about 3 months (around 1/15/2019).      Who to contact     If you have questions or need follow up information about today's clinic visit or your schedule please contact [facility] [place] directly at [phone].  Normal or non-critical lab and imaging results will be communicated to you by MyChart, letter or phone within 4 business days after the clinic has received the results. If you do not hear from us within 7 days, please contact the clinic through MyChart or phone. If you have a critical or abnormal lab result, we will notify you by phone as soon as  "possible.  Submit refill requests through [employer] or call your pharmacy and they will forward the refill request to us. Please allow 3 business days for your refill to be completed.          Additional Information About Your Visit        [employer] Information     [employer] lets you send messages to your doctor, view your test results, renew your prescriptions, schedule appointments and more. To sign up, go to [email or web address] . Click on \"Log in\" on the left side of the screen, which will take you to the Welcome page. Then click on \"Sign up Now\" on the right side of the page.     You will be asked to enter the access code listed below, as well as some personal information. Please follow the directions to create your username and password.     Your access code is: [email or web address]  Expires: 2019  6:26 PM     Your access code will  in 90 days. If you need help or a new code, please call your [place] clinic or [phone].        Care EveryWhere ID     This is your Care EveryWhere ID. This could be used by other organizations to access your [place] medical records  [id number]        Your Vitals Were     Pulse Temperature Respirations Height Pulse Oximetry BMI (Body Mass Index)    99 97.4  F (36.3  C) (Oral) 16 1.803 m (5' 11\") 97% 27.45 kg/m2       Blood Pressure from Last 3 Encounters:   10/15/18 (!) 143/96   18 149/88   07/10/18 136/70    Weight from Last 3 Encounters:   10/15/18 89.3 kg (196 lb 12.8 oz)   18 89.2 kg (196 lb 9.6 oz)   07/10/18 88.5 kg (195 lb)              We Performed the Following     SLEEP DENTAL REFERRAL        Primary Care Provider Office Phone # Fax #    Adeel Wakler [phone] [address]  [facility] [address]        Equal Access to Services     DORIS ADORNO AH: Hadii rosemary Hahn, aneta ling, javan jacksonsh la'aan ah. [facility] [phone].    ATENCIÓN: Si ehsan davis, " tiene a shoemaker disposición servicios gratuitos de asistencia lingüística. Espinoza angel [phone].    We comply with applicable federal civil rights laws and [place] laws. We do not discriminate on the basis of race, color, national origin, age, disability, sex, sexual orientation, or gender identity.            Thank you!     Thank you for choosing [facility] [place]  for your care. Our goal is always to provide you with excellent care. Hearing back from our patients is one way we can continue to improve our services. Please take a few minutes to complete the written survey that you may receive in the mail after your visit with us. Thank you!             Your Updated Medication List - Protect others around you: Learn how to safely use, store and throw away your medicines at www.disposemymeds.org.          This list is accurate as of 10/15/18  6:26 PM.  Always use your most recent med list.                   Brand Name Dispense Instructions for use Diagnosis    amLODIPine-benazepril 5-20 MG per capsule    LOTREL    90 capsule    TAKE ONE CAPSULE BY MOUTH ONCE DAILY    Hypertension goal BP (blood pressure) < 140/80       aspirin 81 MG tablet      Take 1 tablet by mouth daily.        CO Q 10 PO      Take 1 tablet by mouth daily.        ibuprofen 200 MG tablet    ADVIL/MOTRIN     Take 400 mg by mouth every 4 hours as needed for mild pain        simvastatin 20 MG tablet    ZOCOR    90 tablet    Take 1 tablet (20 mg) by mouth At Bedtime    Hyperlipidemia LDL goal <100

## 2018-10-15 NOTE — PATIENT INSTRUCTIONS

## 2018-10-15 NOTE — NURSING NOTE
"Chief Complaint   Patient presents with     Study Results     PSG f/u       Initial BP (!) 141/91  Pulse 99  Temp 97.4  F (36.3  C) (Oral)  Resp 16  Ht 1.803 m (5' 11\")  Wt 89.3 kg (196 lb 12.8 oz)  SpO2 97%  BMI 27.45 kg/m2 Estimated body mass index is 27.45 kg/(m^2) as calculated from the following:    Height as of this encounter: 1.803 m (5' 11\").    Weight as of this encounter: 89.3 kg (196 lb 12.8 oz).    Medication Reconciliation: complete     ESS 4  Ann Lamb        "

## 2018-10-25 ENCOUNTER — TRANSFERRED RECORDS (OUTPATIENT)
Dept: HEALTH INFORMATION MANAGEMENT | Facility: CLINIC | Age: 68
End: 2018-10-25

## 2019-05-22 ENCOUNTER — TRANSFERRED RECORDS (OUTPATIENT)
Dept: HEALTH INFORMATION MANAGEMENT | Facility: CLINIC | Age: 69
End: 2019-05-22

## 2019-10-22 ENCOUNTER — OFFICE VISIT (OUTPATIENT)
Dept: FAMILY MEDICINE | Facility: CLINIC | Age: 69
End: 2019-10-22
Payer: MEDICARE

## 2019-10-22 VITALS
HEART RATE: 77 BPM | WEIGHT: 190.5 LBS | HEIGHT: 72 IN | RESPIRATION RATE: 18 BRPM | TEMPERATURE: 98 F | DIASTOLIC BLOOD PRESSURE: 76 MMHG | OXYGEN SATURATION: 98 % | SYSTOLIC BLOOD PRESSURE: 140 MMHG | BODY MASS INDEX: 25.8 KG/M2

## 2019-10-22 DIAGNOSIS — Z52.008 BLOOD DONOR: ICD-10-CM

## 2019-10-22 DIAGNOSIS — D50.0 IRON DEFICIENCY ANEMIA DUE TO CHRONIC BLOOD LOSS: ICD-10-CM

## 2019-10-22 DIAGNOSIS — I10 HYPERTENSION GOAL BP (BLOOD PRESSURE) < 140/80: Primary | ICD-10-CM

## 2019-10-22 DIAGNOSIS — E78.5 HYPERLIPIDEMIA LDL GOAL <100: ICD-10-CM

## 2019-10-22 DIAGNOSIS — N40.0 PROSTATE ENLARGEMENT: ICD-10-CM

## 2019-10-22 LAB
BASOPHILS # BLD AUTO: 0 10E9/L (ref 0–0.2)
BASOPHILS NFR BLD AUTO: 0.2 %
DIFFERENTIAL METHOD BLD: NORMAL
EOSINOPHIL # BLD AUTO: 0.1 10E9/L (ref 0–0.7)
EOSINOPHIL NFR BLD AUTO: 1.2 %
ERYTHROCYTE [DISTWIDTH] IN BLOOD BY AUTOMATED COUNT: 12.8 % (ref 10–15)
HCT VFR BLD AUTO: 45.9 % (ref 40–53)
HGB BLD-MCNC: 16 G/DL (ref 13.3–17.7)
LYMPHOCYTES # BLD AUTO: 1.8 10E9/L (ref 0.8–5.3)
LYMPHOCYTES NFR BLD AUTO: 29.6 %
MCH RBC QN AUTO: 32.7 PG (ref 26.5–33)
MCHC RBC AUTO-ENTMCNC: 34.9 G/DL (ref 31.5–36.5)
MCV RBC AUTO: 94 FL (ref 78–100)
MONOCYTES # BLD AUTO: 0.4 10E9/L (ref 0–1.3)
MONOCYTES NFR BLD AUTO: 7.2 %
NEUTROPHILS # BLD AUTO: 3.7 10E9/L (ref 1.6–8.3)
NEUTROPHILS NFR BLD AUTO: 61.8 %
PLATELET # BLD AUTO: 190 10E9/L (ref 150–450)
RBC # BLD AUTO: 4.9 10E12/L (ref 4.4–5.9)
WBC # BLD AUTO: 5.9 10E9/L (ref 4–11)

## 2019-10-22 PROCEDURE — 36415 COLL VENOUS BLD VENIPUNCTURE: CPT | Performed by: INTERNAL MEDICINE

## 2019-10-22 PROCEDURE — 85025 COMPLETE CBC W/AUTO DIFF WBC: CPT | Performed by: INTERNAL MEDICINE

## 2019-10-22 PROCEDURE — 80053 COMPREHEN METABOLIC PANEL: CPT | Performed by: INTERNAL MEDICINE

## 2019-10-22 PROCEDURE — 82728 ASSAY OF FERRITIN: CPT | Performed by: INTERNAL MEDICINE

## 2019-10-22 PROCEDURE — 80061 LIPID PANEL: CPT | Performed by: INTERNAL MEDICINE

## 2019-10-22 PROCEDURE — 99214 OFFICE O/P EST MOD 30 MIN: CPT | Performed by: INTERNAL MEDICINE

## 2019-10-22 RX ORDER — AMLODIPINE AND BENAZEPRIL HYDROCHLORIDE 5; 20 MG/1; MG/1
CAPSULE ORAL
Qty: 90 CAPSULE | Refills: 4 | Status: SHIPPED | OUTPATIENT
Start: 2019-10-22 | End: 2020-11-04

## 2019-10-22 RX ORDER — SIMVASTATIN 20 MG
20 TABLET ORAL AT BEDTIME
Qty: 90 TABLET | Refills: 4 | Status: SHIPPED | OUTPATIENT
Start: 2019-10-22 | End: 2020-11-04

## 2019-10-22 ASSESSMENT — MIFFLIN-ST. JEOR: SCORE: 1654.16

## 2019-10-22 NOTE — PROGRESS NOTES
"Subjective     Herminio Wesley is a 69 year old male who presents to clinic today for the following health issues:    HPI   Hyperlipidemia Follow-Up      Are you having any of the following symptoms? (Select all that apply)  No complaints of shortness of breath, chest pain or pressure.  No increased sweating or nausea with activity.  No left-sided neck or arm pain.  No complaints of pain in calves when walking 1-2 blocks.    Are you regularly taking any medication or supplement to lower your cholesterol?   Yes- simvastatin    Are you having muscle aches or other side effects that you think could be caused by your cholesterol lowering medication?  No    Hypertension Follow-up      Do you check your blood pressure regularly outside of the clinic? Yes     Are you following a low salt diet? No    Are your blood pressures ever more than 140 on the top number (systolic) OR more   than 90 on the bottom number (diastolic), for example 140/90? No      How many servings of fruits and vegetables do you eat daily?  0-1    On average, how many sweetened beverages do you drink each day (soda, juice, sweet tea, etc)?   0    How many days per week do you miss taking your medication? 0    Still donates blood; approx Q 3-4 months.   Takes occas Fe.                 Home BP mainly very good over the past 2 wks.                 S/p colonoscopy, shingrix times 2, and snoring evaluation. Did not tolerate dental appliance.            Feels healthy.                                   [employer] member.              Patient Active Problem List    Diagnosis Date Noted     Amaurosis fugax of left eye 07/11/2013     Priority: High     In 2006; Carotid US mild plaque; PERNELL neg except for \"small aortic valve strands\" of uncertain significance; no further sx. He continue ASA 81 mg per day with a statin and BP meds       Hyperlipidemia LDL goal <100 07/10/2013     Priority: High     Hypertension goal BP (blood pressure) < 140/80 07/10/2013     " "Priority: High     Snoring 07/10/2018     Priority: Medium     Did not tolerate dental appliance.                  Avoids sleeping in certain positions.        Iron deficiency anemia due to chronic blood loss 07/13/2016     Priority: Medium     Blood donor 07/21/2015     Priority: Medium     On a regular basis; approx Q 3 months      (ferritin 21 in 7/15; add Fe) (14,4 and 37 in 7/16) ; 14.4 and 36 in 7/18       Prostate enlargement 07/11/2013     Priority: Medium     bx neg in 2004; Dr. Segura.          In 2009, PSA went up to 7.7, then down to 2.3, and pt decided to forgo future PSA checks.  Up to 8.31 several wks after a UTI; see Dr. Mars's note; then down to 6.6 in 9/13 with 8% free; he planned further f/u ; MRI of prostate \"ok\";  PSA = 8 in 7/14; continue to monitor;           7 in 4/15; see urology note; recheck in 6 months               PER PT, Down to 6 or so with NSAID Rx in 11/15; pt is continuing NSAID Rx.              In 7/17, digital exam ok by MLO, and in 7/18 and in 10/19       History of UTI 07/11/2013     Priority: Medium     Advanced directives, counseling/discussion (ACP) 07/15/2014     Priority: Low     Advance Care Planning:   ACP Review and Resources Provided:  Reviewed chart for advance care plan.  Herminio QUIROZ Maryjane has no plan or code status on file however states presence of ACP document. Copy requested. Added by Mary Osorio on 7/23/2014                 Preventive measure 07/10/2013     Priority: Low     APRIMA DATA BASE UNDER THE 7/10/13 NOTE  Colonoscopy neg in 5/14 and in 5/19; recheck in 5 yrs         Past Surgical History:   Procedure Laterality Date     ENT SURGERY      tonsillectomy       Current Outpatient Medications   Medication Sig Dispense Refill     amLODIPine-benazepril (LOTREL) 5-20 MG capsule TAKE ONE CAPSULE BY MOUTH ONCE DAILY 90 capsule 4     aspirin 81 MG tablet Take 1 tablet by mouth daily.       Coenzyme Q10 (CO Q 10 PO) Take 1 tablet by mouth daily.       " "ibuprofen (ADVIL,MOTRIN) 200 MG tablet Take 400 mg by mouth every 4 hours as needed for mild pain       simvastatin (ZOCOR) 20 MG tablet Take 1 tablet (20 mg) by mouth At Bedtime 90 tablet 4     Allergies   Allergen Reactions     Penicillins Rash     BP Readings from Last 3 Encounters:   10/22/19 (!) 140/76   10/15/18 (!) 143/96   07/23/18 149/88    Wt Readings from Last 3 Encounters:   10/22/19 86.4 kg (190 lb 8 oz)   10/15/18 89.3 kg (196 lb 12.8 oz)   07/23/18 89.2 kg (196 lb 9.6 oz)                    Reviewed and updated as needed this visit by Provider         Review of Systems   ROS COMP: CONSTITUTIONAL:NEGATIVE for fever, chills, change in weight  RESP:NEGATIVE for significant cough or SOB  CV: NEGATIVE for chest pain, palpitations or peripheral edema  : negative for dysuria, hematuria, decreased urinary stream      Objective    BP (!) 140/76 (Patient Position: Sitting, Cuff Size: Adult Regular)   Pulse 77   Temp 98  F (36.7  C) (Tympanic)   Resp 18   Ht 1.816 m (5' 11.5\")   Wt 86.4 kg (190 lb 8 oz)   SpO2 98%   BMI 26.20 kg/m    Body mass index is 26.2 kg/m .  Physical Exam   GENERAL APPEARANCE: healthy, alert and no distress  RESP: lungs clear to auscultation - no rales, rhonchi or wheezes  CV: regular rates and rhythm, normal S1 S2, no S3 or S4 and no murmur, click or rub   (male): prostate 1+ enlarged,no nodules or induration            Assessment & Plan     Herminio was seen today for hypertension and lipids.    Diagnoses and all orders for this visit:    Hypertension goal BP (blood pressure) < 140/80  -     Comprehensive metabolic panel (BMP + Alb, Alk Phos, ALT, AST, Total. Bili, TP)  -     amLODIPine-benazepril (LOTREL) 5-20 MG capsule; TAKE ONE CAPSULE BY MOUTH ONCE DAILY    Hyperlipidemia LDL goal <100  -     Lipid Profile (Chol, Trig, HDL, LDL calc)  -     simvastatin (ZOCOR) 20 MG tablet; Take 1 tablet (20 mg) by mouth At Bedtime    Prostate enlargement    Iron deficiency anemia due to " "chronic blood loss  -     Ferritin  -     CBC with platelets and differential    Blood donor  -     Ferritin  -     CBC with platelets and differential         BMI:   Estimated body mass index is 26.2 kg/m  as calculated from the following:    Height as of this encounter: 1.816 m (5' 11.5\").    Weight as of this encounter: 86.4 kg (190 lb 8 oz).           Summary and implications:  We reviewed multiple issues.           We reviewed all of the issues on the diagnoses list.            Check labs and adjust medications as indicated.     Fasting today.   Patient Instructions   Keep checking your blood pressure.              Let me know if you are getting readings over 140 or over 80.                                                  I will let you know your lab results.         Patient Instructions   Keep checking your blood pressure.              Let me know if you are getting readings over 140 or over 80.                                                  I will let you know your lab results.         Return in about 6 months (around 4/22/2020) for BP Recheck.    Adeel Walker MD  [facility]     Results for orders placed or performed in visit on 10/22/19   Comprehensive metabolic panel (BMP + Alb, Alk Phos, ALT, AST, Total. Bili, TP)   Result Value Ref Range    Sodium 133 133 - 144 mmol/L    Potassium 4.0 3.4 - 5.3 mmol/L    Chloride 100 94 - 109 mmol/L    Carbon Dioxide 24 20 - 32 mmol/L    Anion Gap 9 3 - 14 mmol/L    Glucose 83 70 - 99 mg/dL    Urea Nitrogen 19 7 - 30 mg/dL    Creatinine 0.84 0.66 - 1.25 mg/dL    GFR Estimate 89 >60 mL/min/[1.73_m2]    GFR Estimate If Black >90 >60 mL/min/[1.73_m2]    Calcium 9.2 8.5 - 10.1 mg/dL    Bilirubin Total 0.8 0.2 - 1.3 mg/dL    Albumin 4.5 3.4 - 5.0 g/dL    Protein Total 8.1 6.8 - 8.8 g/dL    Alkaline Phosphatase 72 40 - 150 U/L    ALT 21 0 - 70 U/L    AST 13 0 - 45 U/L   Ferritin   Result Value Ref Range    Ferritin 56 26 - 388 ng/mL   Lipid " Profile (Chol, Trig, HDL, LDL calc)   Result Value Ref Range    Cholesterol 186 <200 mg/dL    Triglycerides 62 <150 mg/dL    HDL Cholesterol 84 >39 mg/dL    LDL Cholesterol Calculated 90 <100 mg/dL    Non HDL Cholesterol 102 <130 mg/dL   CBC with platelets and differential   Result Value Ref Range    WBC 5.9 4.0 - 11.0 10e9/L    RBC Count 4.90 4.4 - 5.9 10e12/L    Hemoglobin 16.0 13.3 - 17.7 g/dL    Hematocrit 45.9 40.0 - 53.0 %    MCV 94 78 - 100 fl    MCH 32.7 26.5 - 33.0 pg    MCHC 34.9 31.5 - 36.5 g/dL    RDW 12.8 10.0 - 15.0 %    Platelet Count 190 150 - 450 10e9/L    Diff Method Automated Method     % Neutrophils 61.8 %    % Lymphocytes 29.6 %    % Monocytes 7.2 %    % Eosinophils 1.2 %    % Basophils 0.2 %    Absolute Neutrophil 3.7 1.6 - 8.3 10e9/L    Absolute Lymphocytes 1.8 0.8 - 5.3 10e9/L    Absolute Monocytes 0.4 0.0 - 1.3 10e9/L    Absolute Eosinophils 0.1 0.0 - 0.7 10e9/L    Absolute Basophils 0.0 0.0 - 0.2 10e9/L     Letter sent.              Your lab results are normal,including the liver,kidney,glucose,bone marrow,cholesterol,potassium,and even the iron level (ferritin).      Keep taking the same medications.

## 2019-10-22 NOTE — PATIENT INSTRUCTIONS
Keep checking your blood pressure.              Let me know if you are getting readings over 140 or over 80.                                                  I will let you know your lab results.

## 2019-10-22 NOTE — LETTER
October 23, 2019      Herminio Wesley  [address] [address]        Dear ,    We are writing to inform you of your test results.          Your lab results are normal,including the liver,kidney,glucose,bone marrow,cholesterol,potassium,and even the iron level (ferritin).      Keep taking the same medications.     Results for orders placed or performed in visit on 10/22/19   Comprehensive metabolic panel (BMP + Alb, Alk Phos, ALT, AST, Total. Bili, TP)   Result Value Ref Range    Sodium 133 133 - 144 mmol/L    Potassium 4.0 3.4 - 5.3 mmol/L    Chloride 100 94 - 109 mmol/L    Carbon Dioxide 24 20 - 32 mmol/L    Anion Gap 9 3 - 14 mmol/L    Glucose 83 70 - 99 mg/dL    Urea Nitrogen 19 7 - 30 mg/dL    Creatinine 0.84 0.66 - 1.25 mg/dL    GFR Estimate 89 >60 mL/min/[1.73_m2]    GFR Estimate If Black >90 >60 mL/min/[1.73_m2]    Calcium 9.2 8.5 - 10.1 mg/dL    Bilirubin Total 0.8 0.2 - 1.3 mg/dL    Albumin 4.5 3.4 - 5.0 g/dL    Protein Total 8.1 6.8 - 8.8 g/dL    Alkaline Phosphatase 72 40 - 150 U/L    ALT 21 0 - 70 U/L    AST 13 0 - 45 U/L   Ferritin   Result Value Ref Range    Ferritin 56 26 - 388 ng/mL   Lipid Profile (Chol, Trig, HDL, LDL calc)   Result Value Ref Range    Cholesterol 186 <200 mg/dL    Triglycerides 62 <150 mg/dL    HDL Cholesterol 84 >39 mg/dL    LDL Cholesterol Calculated 90 <100 mg/dL    Non HDL Cholesterol 102 <130 mg/dL   CBC with platelets and differential   Result Value Ref Range    WBC 5.9 4.0 - 11.0 10e9/L    RBC Count 4.90 4.4 - 5.9 10e12/L    Hemoglobin 16.0 13.3 - 17.7 g/dL    Hematocrit 45.9 40.0 - 53.0 %    MCV 94 78 - 100 fl    MCH 32.7 26.5 - 33.0 pg    MCHC 34.9 31.5 - 36.5 g/dL    RDW 12.8 10.0 - 15.0 %    Platelet Count 190 150 - 450 10e9/L    Diff Method Automated Method     % Neutrophils 61.8 %    % Lymphocytes 29.6 %    % Monocytes 7.2 %    % Eosinophils 1.2 %    % Basophils 0.2 %    Absolute Neutrophil 3.7 1.6 - 8.3 10e9/L    Absolute Lymphocytes 1.8 0.8  - 5.3 10e9/L    Absolute Monocytes 0.4 0.0 - 1.3 10e9/L    Absolute Eosinophils 0.1 0.0 - 0.7 10e9/L    Absolute Basophils 0.0 0.0 - 0.2 10e9/L         If you have any questions or concerns, please call the clinic at the number listed above.       Sincerely,        Adeel Walker MD

## 2019-10-23 LAB
ALBUMIN SERPL-MCNC: 4.5 G/DL (ref 3.4–5)
ALP SERPL-CCNC: 72 U/L (ref 40–150)
ALT SERPL W P-5'-P-CCNC: 21 U/L (ref 0–70)
ANION GAP SERPL CALCULATED.3IONS-SCNC: 9 MMOL/L (ref 3–14)
AST SERPL W P-5'-P-CCNC: 13 U/L (ref 0–45)
BILIRUB SERPL-MCNC: 0.8 MG/DL (ref 0.2–1.3)
BUN SERPL-MCNC: 19 MG/DL (ref 7–30)
CALCIUM SERPL-MCNC: 9.2 MG/DL (ref 8.5–10.1)
CHLORIDE SERPL-SCNC: 100 MMOL/L (ref 94–109)
CHOLEST SERPL-MCNC: 186 MG/DL
CO2 SERPL-SCNC: 24 MMOL/L (ref 20–32)
CREAT SERPL-MCNC: 0.84 MG/DL (ref 0.66–1.25)
FERRITIN SERPL-MCNC: 56 NG/ML (ref 26–388)
GFR SERPL CREATININE-BSD FRML MDRD: 89 ML/MIN/{1.73_M2}
GLUCOSE SERPL-MCNC: 83 MG/DL (ref 70–99)
HDLC SERPL-MCNC: 84 MG/DL
LDLC SERPL CALC-MCNC: 90 MG/DL
NONHDLC SERPL-MCNC: 102 MG/DL
POTASSIUM SERPL-SCNC: 4 MMOL/L (ref 3.4–5.3)
PROT SERPL-MCNC: 8.1 G/DL (ref 6.8–8.8)
SODIUM SERPL-SCNC: 133 MMOL/L (ref 133–144)
TRIGL SERPL-MCNC: 62 MG/DL

## 2020-08-13 ENCOUNTER — TELEPHONE (OUTPATIENT)
Dept: FAMILY MEDICINE | Facility: CLINIC | Age: 70
End: 2020-08-13

## 2020-08-13 NOTE — LETTER
August 13, 2020    Herminio Wesley  [address] [address]    Dear JoseR Durham cares about your health and your health plan.  I have reviewed your medical conditions, medication list and lab results, and am making recommendations based on this review to better manage your health.    You are in particular need of attention regarding:  -Wellness (Physical) Visit     I am recommending that you:     -schedule a WELLNESS (Physical) APPOINTMENT with me.    We can also do this as a video visit.      Please call us at the [employer] location:  [phone] or use [employer] to address the above recommendations.     Thank you for trusting [facility].  We appreciate the opportunity to serve you and look forward to supporting your healthcare in the future.    If you have (or plan to have) any of these tests done at a facility other than a [facility] or a [facility], please have the results sent to the [facility] location noted above.      Best Regards,    Adeel Walker MD

## 2020-08-13 NOTE — TELEPHONE ENCOUNTER
Patient Quality Outreach      Summary:    Patient is due/failing the following:   Annual wellness, date due: 10/3/2014    Type of outreach:    Sent letter.    Questions for provider review:    None                                                                                   **Start Working phrase here:**       Patient has the following on his problem list/HM: None                                                [facility]       Chart routed to .

## 2020-11-02 ENCOUNTER — MYC MEDICAL ADVICE (OUTPATIENT)
Dept: FAMILY MEDICINE | Facility: CLINIC | Age: 70
End: 2020-11-02

## 2020-11-04 ENCOUNTER — VIRTUAL VISIT (OUTPATIENT)
Dept: FAMILY MEDICINE | Facility: CLINIC | Age: 70
End: 2020-11-04
Payer: MEDICARE

## 2020-11-04 DIAGNOSIS — Z52.008 BLOOD DONOR: ICD-10-CM

## 2020-11-04 DIAGNOSIS — I10 ESSENTIAL HYPERTENSION WITH GOAL BLOOD PRESSURE LESS THAN 130/80: Primary | ICD-10-CM

## 2020-11-04 DIAGNOSIS — E78.5 HYPERLIPIDEMIA LDL GOAL <100: ICD-10-CM

## 2020-11-04 PROCEDURE — 99214 OFFICE O/P EST MOD 30 MIN: CPT | Mod: 95 | Performed by: INTERNAL MEDICINE

## 2020-11-04 RX ORDER — SIMVASTATIN 20 MG
20 TABLET ORAL AT BEDTIME
Qty: 90 TABLET | Refills: 4 | Status: SHIPPED | OUTPATIENT
Start: 2020-11-04 | End: 2021-11-22

## 2020-11-04 RX ORDER — AMLODIPINE AND BENAZEPRIL HYDROCHLORIDE 5; 20 MG/1; MG/1
CAPSULE ORAL
Qty: 90 CAPSULE | Refills: 4 | Status: SHIPPED | OUTPATIENT
Start: 2020-11-04 | End: 2021-11-22

## 2020-11-04 NOTE — PATIENT INSTRUCTIONS
I will let you know your lab results.      As we discussed, take the iron every other day for 4 weeks around the time of each blood donation.

## 2020-11-10 DIAGNOSIS — I10 ESSENTIAL HYPERTENSION WITH GOAL BLOOD PRESSURE LESS THAN 130/80: ICD-10-CM

## 2020-11-10 DIAGNOSIS — E78.5 HYPERLIPIDEMIA LDL GOAL <100: ICD-10-CM

## 2020-11-10 LAB
ALBUMIN SERPL-MCNC: 3.6 G/DL (ref 3.4–5)
ALP SERPL-CCNC: 57 U/L (ref 40–150)
ALT SERPL W P-5'-P-CCNC: 24 U/L (ref 0–70)
ANION GAP SERPL CALCULATED.3IONS-SCNC: 4 MMOL/L (ref 3–14)
AST SERPL W P-5'-P-CCNC: 13 U/L (ref 0–45)
BILIRUB SERPL-MCNC: 0.6 MG/DL (ref 0.2–1.3)
BUN SERPL-MCNC: 18 MG/DL (ref 7–30)
CALCIUM SERPL-MCNC: 8.6 MG/DL (ref 8.5–10.1)
CHLORIDE SERPL-SCNC: 107 MMOL/L (ref 94–109)
CHOLEST SERPL-MCNC: 161 MG/DL
CO2 SERPL-SCNC: 27 MMOL/L (ref 20–32)
CREAT SERPL-MCNC: 0.86 MG/DL (ref 0.66–1.25)
GFR SERPL CREATININE-BSD FRML MDRD: 87 ML/MIN/{1.73_M2}
GLUCOSE SERPL-MCNC: 110 MG/DL (ref 70–99)
HDLC SERPL-MCNC: 69 MG/DL
LDLC SERPL CALC-MCNC: 77 MG/DL
NONHDLC SERPL-MCNC: 92 MG/DL
POTASSIUM SERPL-SCNC: 4.5 MMOL/L (ref 3.4–5.3)
PROT SERPL-MCNC: 7.1 G/DL (ref 6.8–8.8)
SODIUM SERPL-SCNC: 138 MMOL/L (ref 133–144)
TRIGL SERPL-MCNC: 75 MG/DL

## 2020-11-10 PROCEDURE — 36415 COLL VENOUS BLD VENIPUNCTURE: CPT | Performed by: INTERNAL MEDICINE

## 2020-11-10 PROCEDURE — 80053 COMPREHEN METABOLIC PANEL: CPT | Performed by: INTERNAL MEDICINE

## 2020-11-10 PROCEDURE — 80061 LIPID PANEL: CPT | Performed by: INTERNAL MEDICINE

## 2021-01-15 ENCOUNTER — HEALTH MAINTENANCE LETTER (OUTPATIENT)
Age: 71
End: 2021-01-15

## 2021-08-04 ENCOUNTER — TRANSFERRED RECORDS (OUTPATIENT)
Dept: HEALTH INFORMATION MANAGEMENT | Facility: CLINIC | Age: 71
End: 2021-08-04

## 2021-09-04 ENCOUNTER — HEALTH MAINTENANCE LETTER (OUTPATIENT)
Age: 71
End: 2021-09-04

## 2021-09-13 ENCOUNTER — TRANSFERRED RECORDS (OUTPATIENT)
Dept: HEALTH INFORMATION MANAGEMENT | Facility: CLINIC | Age: 71
End: 2021-09-13

## 2021-09-22 ENCOUNTER — TRANSFERRED RECORDS (OUTPATIENT)
Dept: HEALTH INFORMATION MANAGEMENT | Facility: CLINIC | Age: 71
End: 2021-09-22

## 2021-11-01 ENCOUNTER — TRANSFERRED RECORDS (OUTPATIENT)
Dept: HEALTH INFORMATION MANAGEMENT | Facility: CLINIC | Age: 71
End: 2021-11-01
Payer: MEDICARE

## 2021-11-08 ENCOUNTER — TRANSFERRED RECORDS (OUTPATIENT)
Dept: HEALTH INFORMATION MANAGEMENT | Facility: CLINIC | Age: 71
End: 2021-11-08
Payer: MEDICARE

## 2021-11-15 ENCOUNTER — TRANSFERRED RECORDS (OUTPATIENT)
Dept: HEALTH INFORMATION MANAGEMENT | Facility: CLINIC | Age: 71
End: 2021-11-15
Payer: MEDICARE

## 2022-02-19 ENCOUNTER — HEALTH MAINTENANCE LETTER (OUTPATIENT)
Age: 72
End: 2022-02-19

## 2022-10-16 ENCOUNTER — HEALTH MAINTENANCE LETTER (OUTPATIENT)
Age: 72
End: 2022-10-16

## 2022-10-26 ENCOUNTER — TRANSFERRED RECORDS (OUTPATIENT)
Dept: HEALTH INFORMATION MANAGEMENT | Facility: CLINIC | Age: 72
End: 2022-10-26

## 2022-11-08 ASSESSMENT — ENCOUNTER SYMPTOMS
DIARRHEA: 0
SHORTNESS OF BREATH: 0
DIZZINESS: 0
ARTHRALGIAS: 1
MYALGIAS: 0
FEVER: 0
SORE THROAT: 0
NERVOUS/ANXIOUS: 0
ABDOMINAL PAIN: 0
JOINT SWELLING: 0
NAUSEA: 0
PALPITATIONS: 0
CHILLS: 0
CONSTIPATION: 0
HEMATOCHEZIA: 0
FREQUENCY: 0
DYSURIA: 0
EYE PAIN: 0
HEADACHES: 0
HEARTBURN: 0
COUGH: 0
PARESTHESIAS: 0
HEMATURIA: 0

## 2022-11-08 ASSESSMENT — ACTIVITIES OF DAILY LIVING (ADL): CURRENT_FUNCTION: NO ASSISTANCE NEEDED

## 2022-11-15 ENCOUNTER — OFFICE VISIT (OUTPATIENT)
Dept: FAMILY MEDICINE | Facility: CLINIC | Age: 72
End: 2022-11-15

## 2022-11-15 VITALS
HEART RATE: 79 BPM | SYSTOLIC BLOOD PRESSURE: 147 MMHG | OXYGEN SATURATION: 97 % | HEIGHT: 71 IN | DIASTOLIC BLOOD PRESSURE: 76 MMHG | WEIGHT: 199.6 LBS | BODY MASS INDEX: 27.94 KG/M2

## 2022-11-15 DIAGNOSIS — Z86.69 HISTORY OF AMAUROSIS FUGAX: ICD-10-CM

## 2022-11-15 DIAGNOSIS — I10 ESSENTIAL HYPERTENSION WITH GOAL BLOOD PRESSURE LESS THAN 130/80: ICD-10-CM

## 2022-11-15 DIAGNOSIS — E78.5 HYPERLIPIDEMIA LDL GOAL <100: ICD-10-CM

## 2022-11-15 DIAGNOSIS — Z11.59 NEED FOR HEPATITIS C SCREENING TEST: ICD-10-CM

## 2022-11-15 DIAGNOSIS — R97.20 ELEVATED PROSTATE SPECIFIC ANTIGEN (PSA): ICD-10-CM

## 2022-11-15 DIAGNOSIS — Z00.00 ENCOUNTER FOR MEDICARE ANNUAL WELLNESS EXAM: Primary | ICD-10-CM

## 2022-11-15 DIAGNOSIS — Z13.6 SCREENING FOR AAA (ABDOMINAL AORTIC ANEURYSM): ICD-10-CM

## 2022-11-15 LAB
CHOL/HDL RATIO (RMG): 3.1 MG/DL (ref 0–4.5)
CHOLESTEROL: 152 MG/DL (ref 100–199)
HDL (RMG): 50 MG/DL (ref 40–?)
LDL CALCULATED (RMG): 79 MG/DL (ref 0–130)
TRIGLYCERIDES (RMG): 120 MG/DL (ref 0–149)

## 2022-11-15 PROCEDURE — G0439 PPPS, SUBSEQ VISIT: HCPCS | Performed by: FAMILY MEDICINE

## 2022-11-15 PROCEDURE — 36415 COLL VENOUS BLD VENIPUNCTURE: CPT | Performed by: FAMILY MEDICINE

## 2022-11-15 PROCEDURE — 99204 OFFICE O/P NEW MOD 45 MIN: CPT | Mod: 25 | Performed by: FAMILY MEDICINE

## 2022-11-15 PROCEDURE — 80061 LIPID PANEL: CPT | Mod: QW | Performed by: FAMILY MEDICINE

## 2022-11-15 RX ORDER — AMLODIPINE AND BENAZEPRIL HYDROCHLORIDE 5; 20 MG/1; MG/1
1 CAPSULE ORAL 2 TIMES DAILY
Qty: 90 CAPSULE | Refills: 3
Start: 2022-11-15 | End: 2022-11-18

## 2022-11-15 RX ORDER — MULTIVITAMIN/IRON/FOLIC ACID 18MG-0.4MG
TABLET ORAL
COMMUNITY

## 2022-11-15 NOTE — PROGRESS NOTES
"  SUBJECTIVE:   Herminio Wesley is a 72 year old male who presents for Preventive Visit.    Patient has been advised of split billing requirements and indicates understanding: Yes  Are you in the first 12 months of your Medicare Part B coverage?  No    HTN: on amlodipine-benazepril.  Home readings borderline with about 25% >140/90.  No lows.  No chest pain, SOB.    HLD: on statin.  Reports history of amaurosis fugax but workup was unrevealing and vision now normal    Elevated PSA: had through workup including biopsies and MRI and 4K score.  Reports PSA remained elevated.  He prefers not to monitor PSA ongoing.  Minimal LUTS.    Physical Health:  Answers for HPI/ROS submitted by the patient on 11/8/2022  In general, how would you rate your overall physical health?: excellent  Frequency of exercise:: 2-3 days/week  Do you usually eat at least 4 servings of fruit and vegetables a day, include whole grains & fiber, and avoid regularly eating high fat or \"junk\" foods? : No  Taking medications regularly:: Yes  Medication side effects:: None  Activities of Daily Living: no assistance needed  Home safety: lack of grab bars in the bathroom  Hearing Impairment:: no hearing concerns  In the past 6 months, have you been bothered by leaking of urine?: No  abdominal pain: No  Blood in stool: No  Blood in urine: No  chest pain: No  chills: No  congestion: No  constipation: No  cough: No  diarrhea: No  dizziness: No  ear pain: No  eye pain: No  nervous/anxious: No  fever: No  frequency: No  genital sores: No  headaches: No  hearing loss: No  heartburn: No  arthralgias: Yes  joint swelling: No  peripheral edema: No  mood changes: No  myalgias: No  nausea: No  dysuria: No  palpitations: No  Skin sensation changes: No  sore throat: No  urgency: No  rash: No  shortness of breath: No  visual disturbance: No  impotence: No  penile discharge: No  In general, how would you rate your overall mental or emotional health?: excellent  Additional " concerns today:: No  Duration of exercise:: 30-45 minutes    Hearing Screening:  Right Ear  4000Hz: FAIL  2000Hz: pass  1000Hz: pass  500Hz: pass    Left Ear  4000Hz: FAIL  2000Hz: pass  1000Hz: pass  500Hz: pass    Mental Health:  PHQ-2 Score: 0    Do you feel safe in your environment? Yes    Have you ever done Advance Care Planning? (For example, a Health Directive, POLST, or a discussion with a medical provider or your loved ones about your wishes): Yes, patient states has an Advance Care Planning document and will bring a copy to the clinic.    Additional concerns to address?  No    Fall risk:  Fallen 2 or more times in the past year?: No  Any fall with injury in the past year?: No    Cognitive Screenin) Repeat 3 items (Leader, Season, Table)    2) Clock draw: NORMAL  3) 3 item recall: Recalls 3 objects  Results: 3 items recalled: COGNITIVE IMPAIRMENT LESS LIKELY  Mini-CogTM Copyright S Janes. Licensed by the author for use in [facility]; reprinted with permission (alaina@[facility]). All rights reserved.      Do you have sleep apnea, excessive snoring or daytime drowsiness?: yes        -------------------------------------    Reviewed and updated as needed this visit by clinical staff   Tobacco  Allergies  Meds              Reviewed and updated as needed this visit by Provider                 Social History     Tobacco Use     Smoking status: Never     Smokeless tobacco: Never   Substance Use Topics     Alcohol use: Yes     Comment: 1-2 beers in the evening                           Current providers sharing in care for this patient include:   Patient Care Team:  Hamilton August MD as PCP - General (Family Medicine)    The following health maintenance items are reviewed in Epic and correct as of today:  Health Maintenance   Topic Date Due     COVID-19 Vaccine (1) Never done     HEPATITIS C SCREENING  Never done     DTAP/TDAP/TD IMMUNIZATION (2 - Td or Tdap) 2022     MEDICARE  "ANNUAL WELLNESS VISIT  11/15/2023     FALL RISK ASSESSMENT  11/15/2023     LIPID  11/10/2025     ADVANCE CARE PLANNING  11/15/2027     COLORECTAL CANCER SCREENING  05/22/2029     PHQ-2 (once per calendar year)  Completed     INFLUENZA VACCINE  Completed     Pneumococcal Vaccine: 65+ Years  Completed     ZOSTER IMMUNIZATION  Completed     AORTIC ANEURYSM SCREENING (SYSTEM ASSIGNED)  Completed     IPV IMMUNIZATION  Aged Out     MENINGITIS IMMUNIZATION  Aged Out     Lab work is in process      ROS:  Constitutional, HEENT, cardiovascular, pulmonary, GI, , musculoskeletal, neuro, skin, endocrine and psych systems are negative, except as otherwise noted.    OBJECTIVE:   BP (!) 147/76   Pulse 79   Ht 1.797 m (5' 10.75\")   Wt 90.5 kg (199 lb 9.6 oz)   SpO2 97%   BMI 28.04 kg/m   Estimated body mass index is 28.04 kg/m  as calculated from the following:    Height as of this encounter: 1.797 m (5' 10.75\").    Weight as of this encounter: 90.5 kg (199 lb 9.6 oz).  EXAM:   GENERAL: healthy, alert and no distress  EYES: Eyes grossly normal to inspection, PERRL and conjunctivae and sclerae normal  HENT: ear canals and TM's normal, nose and mouth without ulcers or lesions  NECK: no adenopathy, no asymmetry, masses, or scars and thyroid normal to palpation  RESP: lungs clear to auscultation - no rales, rhonchi or wheezes  CV: regular rate and rhythm, normal S1 S2, no S3 or S4, no murmur, click or rub, no peripheral edema and peripheral pulses strong  ABDOMEN: soft, nontender, no hepatosplenomegaly, no masses and bowel sounds normal  MS: no gross musculoskeletal defects noted, no edema  SKIN: no suspicious lesions or rashes  NEURO: Normal strength and tone, mentation intact and speech normal  PSYCH: mentation appears normal, affect normal/bright    Diagnostic Test Results:  Labs reviewed in Epic  No results found for this or any previous visit (from the past 24 hour(s)).    ASSESSMENT / PLAN:   Encounter for Medicare annual " "wellness exam  - discussed preventative guidelines, healthy diet, exercise and weight management  - VENOUS COLLECTION    Essential hypertension with goal blood pressure less than 130/80  Borderline control, recommend increased dose and recheck with BMP in 1-2 weeks.  Will monitor for low readings.  - Basic Metabolic Panel (8) ([facility])  - amLODIPine-benazepril (LOTREL) 5-20 MG capsule; Take 1 capsule by mouth 2 times daily  - VENOUS COLLECTION    Hyperlipidemia LDL goal <100  Recheck, cont statin  - Lipid Profile ([facility])  - VENOUS COLLECTION    History of amaurosis fugax  On statin and ASA  - VENOUS COLLECTION    Screening for AAA (abdominal aortic aneurysm)  - Abdominal Aortic Aneurysm Screening/Tracking  - VENOUS COLLECTION    Elevated prostate specific antigen (PSA)  Declines further monitoring other than TEZ  - VENOUS COLLECTION    Need for hepatitis C screening test  - HCV Antibody ([facility])  - VENOUS COLLECTION    Patient has been advised of split billing requirements and indicates understanding: Yes    COUNSELING:  Reviewed preventive health counseling, as reflected in patient instructions       Regular exercise       Healthy diet/nutrition    Estimated body mass index is 28.04 kg/m  as calculated from the following:    Height as of this encounter: 1.797 m (5' 10.75\").    Weight as of this encounter: 90.5 kg (199 lb 9.6 oz).        He reports that he has never smoked. He has never used smokeless tobacco.    Appropriate preventive services were discussed with this patient, including applicable screening as appropriate for cardiovascular disease, diabetes, osteopenia/osteoporosis, and glaucoma.  As appropriate for age/gender, discussed screening for colorectal cancer, prostate cancer, breast cancer, and cervical cancer. Checklist reviewing preventive services available has been given to the patient.    Reviewed patients plan of care and provided an AVS. The Basic Care Plan (routine screening as documented in " Health Maintenance) for Herminio meets the Care Plan requirement. This Care Plan has been established and reviewed with the Patient.    Counseling Resources:  ATP IV Guidelines  Pooled Cohorts Equation Calculator  Breast Cancer Risk Calculator  BRCA-Related Cancer Risk Assessment: FHS-7 Tool  FRAX Risk Assessment  ICSI Preventive Guidelines  Dietary Guidelines for Americans, 2010  USDA's MyPlate  ASA Prophylaxis  Lung CA Screening    Hamilton August MD  [facility]

## 2022-11-15 NOTE — PATIENT INSTRUCTIONS
Increase blood pressure medication to twice daily and follow up in 1-2 weeks for recheck    Patient Education   Personalized Prevention Plan  You are due for the preventive services outlined below.  Your care team is available to assist you in scheduling these services.  If you have already completed any of these items, please share that information with your care team to update in your medical record.  Health Maintenance Due   Topic Date Due    COVID-19 Vaccine (1) Never done    Hepatitis C Screening  Never done    AORTIC ANEURYSM SCREENING (SYSTEM ASSIGNED)  Never done    Discuss Advance Care Planning  07/15/2019    FALL RISK ASSESSMENT  10/22/2020    Diptheria Tetanus Pertussis (DTAP/TDAP/TD) Vaccine (2 - Td or Tdap) 04/18/2022

## 2022-11-16 LAB
BUN SERPL-MCNC: 16 MG/DL (ref 8–27)
BUN/CREATININE RATIO: 18 (ref 10–24)
CALCIUM SERPL-MCNC: 9.4 MG/DL (ref 8.6–10.2)
CHLORIDE SERPLBLD-SCNC: 104 MMOL/L (ref 96–106)
CREAT SERPL-MCNC: 0.87 MG/DL (ref 0.76–1.27)
EGFR: 91 ML/MIN/1.73
GLUCOSE SERPL-MCNC: 100 MG/DL (ref 70–99)
HCV AB SERPL QL IA: <0.1 S/CO RATIO (ref 0–0.9)
POTASSIUM SERPL-SCNC: 4.5 MMOL/L (ref 3.5–5.2)
SODIUM SERPL-SCNC: 140 MMOL/L (ref 134–144)
TOTAL CO2: 25 MMOL/L (ref 20–29)

## 2022-11-22 ENCOUNTER — DOCUMENTATION ONLY (OUTPATIENT)
Dept: FAMILY MEDICINE | Facility: CLINIC | Age: 72
End: 2022-11-22

## 2022-11-23 DIAGNOSIS — E78.5 HYPERLIPIDEMIA LDL GOAL <100: Primary | ICD-10-CM

## 2022-11-23 PROCEDURE — 36415 COLL VENOUS BLD VENIPUNCTURE: CPT | Performed by: FAMILY MEDICINE

## 2022-11-24 LAB
BUN SERPL-MCNC: 12 MG/DL (ref 8–27)
BUN/CREATININE RATIO: 13 (ref 10–24)
CALCIUM SERPL-MCNC: 9.5 MG/DL (ref 8.6–10.2)
CHLORIDE SERPLBLD-SCNC: 102 MMOL/L (ref 96–106)
CREAT SERPL-MCNC: 0.95 MG/DL (ref 0.76–1.27)
EGFR: 85 ML/MIN/1.73
GLUCOSE SERPL-MCNC: 100 MG/DL (ref 70–99)
POTASSIUM SERPL-SCNC: 5.1 MMOL/L (ref 3.5–5.2)
SODIUM SERPL-SCNC: 138 MMOL/L (ref 134–144)
TOTAL CO2: 25 MMOL/L (ref 20–29)

## 2022-11-29 NOTE — PROGRESS NOTES
11/22/22 Received fax from insurance company that prior authorization for amlodipine has been approved.  Patient and pharmacy have been informed.    Inder Herrera,   [facility]  [phone]

## 2023-07-06 ENCOUNTER — OFFICE VISIT (OUTPATIENT)
Dept: FAMILY MEDICINE | Facility: CLINIC | Age: 73
End: 2023-07-06
Payer: COMMERCIAL

## 2023-07-06 DIAGNOSIS — L57.0 ACTINIC KERATOSIS: Primary | ICD-10-CM

## 2023-07-06 DIAGNOSIS — I10 ESSENTIAL HYPERTENSION WITH GOAL BLOOD PRESSURE LESS THAN 130/80: ICD-10-CM

## 2023-07-06 DIAGNOSIS — L81.4 LENTIGINES: ICD-10-CM

## 2023-07-06 DIAGNOSIS — D48.9 NEOPLASM OF UNCERTAIN BEHAVIOR: ICD-10-CM

## 2023-07-06 DIAGNOSIS — E78.5 HYPERLIPIDEMIA LDL GOAL <100: ICD-10-CM

## 2023-07-06 PROCEDURE — 99203 OFFICE O/P NEW LOW 30 MIN: CPT | Mod: 25 | Performed by: PHYSICIAN ASSISTANT

## 2023-07-06 PROCEDURE — 11102 TANGNTL BX SKIN SINGLE LES: CPT | Performed by: PHYSICIAN ASSISTANT

## 2023-07-06 PROCEDURE — 17000 DESTRUCT PREMALG LESION: CPT | Mod: XS | Performed by: PHYSICIAN ASSISTANT

## 2023-07-06 PROCEDURE — 17003 DESTRUCT PREMALG LES 2-14: CPT | Performed by: PHYSICIAN ASSISTANT

## 2023-07-06 PROCEDURE — 11103 TANGNTL BX SKIN EA SEP/ADDL: CPT | Performed by: PHYSICIAN ASSISTANT

## 2023-07-06 PROCEDURE — 88305 TISSUE EXAM BY PATHOLOGIST: CPT | Performed by: DERMATOLOGY

## 2023-07-06 ASSESSMENT — PAIN SCALES - GENERAL: PAINLEVEL: NO PAIN (0)

## 2023-07-06 NOTE — PROGRESS NOTES
[facility] Dermatology Note  Encounter Date: Jul 6, 2023  Office Visit      Dermatology Problem List:  1. NUB x2 - L posterior neck, L anterior thigh - bx 7/6/23  2. 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 Spends dubon in [place].  ____________________________________________    Assessment & Plan:  # Actinic keratoses x 2 - L cheek.   - Cryotherapy performed today, see procedure note below.     # Neoplasm of unspecified behavior of the skin (D49.2) on the L posterior neck. The differential diagnosis includes NMSc vs Prurigo nodule vs other.   - Shave biopsy performed today, see procedure note below.    # Neoplasm of unspecified behavior of the skin (D49.2) on the L anterior thigh. The differential diagnosis includes NMSC vs benign nevus vs other .   - Shave biopsy performed today, see procedure note below.     # Solar lentigines.   - Signs and Symptoms of non-melanoma skin cancer and ABCDEs of melanoma reviewed with patient. Patient encouraged to perform monthly self skin exams and educated on how to perform them. UV precautions reviewed with patient. Patient was asked about new or changing moles/lesions on body.   - Sunscreen: Apply 20 minutes prior to going outdoors and reapply every two hours, when wet or sweating. We recommend using an SPF 30 or higher, and to use one that is water resistant.   - recommend FBSE in 1 year      Procedures Performed:   - Shave biopsy procedure note, location(s): see above. After discussion of benefits and risks including but not limited to bleeding, infection, scar, incomplete removal, recurrence, and non-diagnostic biopsy, written consent and photographs were obtained. The area was cleaned with isopropyl alcohol. 0.5mL of 1% lidocaine with epinephrine was injected to obtain adequate anesthesia of lesion(s). Shave biopsy at site(s) performed. Hemostasis was achieved with aluminium chloride. Petrolatum ointment and a sterile dressing were  applied. The patient tolerated the procedure and no complications were noted. The patient was provided with verbal and written post care instructions.     - Cryotherapy procedure note, location(s): left cheek. After verbal consent and discussion of risks and benefits including, but not limited to, dyspigmentation/scar, blister, and pain, 2 lesion(s) was(were) treated with 1-2 mm freeze border for 1-2 cycles with liquid nitrogen. Post cryotherapy instructions were provided.     Follow-up: 1 year(s) in-person, or earlier for new or changing lesions    Staff:     All risks, benefits and alternatives were discussed with patient.  Patient is in agreement and understands the assessment and plan.  All questions were answered.    Leilani Galicia PA-C, MPAS  [facility] [place]: Phone: [phone], Fax: [phone]  [facility]: Phone: [phone],  Fax: [phone]  [facility]: Phone: [phone], Fax: [phone]  ____________________________________________    CC: Derm Problem (Concerned about spot on L cheek and neck)      HPI:  Mr. Herminio Wesley is a 72 year old male who presents today as a new patient for spot on the L cheek and neck. No hx skin cancer.     Patient is otherwise feeling well, without additional concerns.    Labs:  none    Physical Exam:  Vitals: There were no vitals taken for this visit.  SKIN: Sun-exposed skin, which includes the head/face, neck, both arms, digits, and/or nails was examined.    - L posterior neck - 5mm pink scaly papule  - L anterior thigh - 6mm pink shiny papule  - There are erythematous macules with overyling adherent scale on the L cheek x 2  - skin is tanned, Dequan II.   - Scattered brown macules on sun exposed areas..   - No other lesions of concern on areas examined.                Medications:  Current Outpatient Medications   Medication     amLODIPine-benazepril (LOTREL)  "5-20 MG capsule     aspirin 81 MG tablet     Coenzyme Q10 (CO Q 10 PO)     glucosamine-chondroitinoitin 8903-5959 MG/30ML LIQD     ibuprofen (ADVIL,MOTRIN) 200 MG tablet     simvastatin (ZOCOR) 20 MG tablet     No current facility-administered medications for this visit.      Past Medical/Surgical History:   Patient Active Problem List   Diagnosis     Preventive measure     Hyperlipidemia LDL goal <100     Essential hypertension with goal blood pressure less than 130/80     Prostate enlargement     History of UTI     Advanced directives, counseling/discussion (ACP)     Blood donor     Iron deficiency anemia due to chronic blood loss     Snoring     History of amaurosis fugax     Elevated prostate specific antigen (PSA)     Past Medical History:   Diagnosis Date     Amaurosis fugax of left eye 7/11/2013    In 2006; Carotid US mild plaque; PERNELL neg except for \"small aortic valve strands\" of uncertain significance; no further sx. He continue ASA 81 mg per day with a statin and BP meds     High cholesterol      Hypertension                       "

## 2023-07-06 NOTE — LETTER
7/6/2023         RE: Herminio Wesley  [address]  [facility] [address]        Dear Colleague,    Thank you for referring your patient, Herminio Wesley, to the [facility]. Please see a copy of my visit note below.    [facility] Dermatology Note  Encounter Date: Jul 6, 2023  Office Visit      Dermatology Problem List:  1. NUB x2 - L posterior neck, L anterior thigh - bx 7/6/23  2. 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  ____________________________________________    Assessment & Plan:  # Actinic keratoses x 2 - L cheek.   - Cryotherapy performed today, see procedure note below.     # Neoplasm of unspecified behavior of the skin (D49.2) on the L posterior neck. The differential diagnosis includes NMSc vs Prurigo nodule vs other.   - Shave biopsy performed today, see procedure note below.    # Neoplasm of unspecified behavior of the skin (D49.2) on the L anterior thigh. The differential diagnosis includes NMSC vs benign nevus vs other .   - Shave biopsy performed today, see procedure note below.     # Solar lentigines.   - Signs and Symptoms of non-melanoma skin cancer and ABCDEs of melanoma reviewed with patient. Patient encouraged to perform monthly self skin exams and educated on how to perform them. UV precautions reviewed with patient. Patient was asked about new or changing moles/lesions on body.   - Sunscreen: Apply 20 minutes prior to going outdoors and reapply every two hours, when wet or sweating. We recommend using an SPF 30 or higher, and to use one that is water resistant.   - recommend FBSE in 1 year      Procedures Performed:   - Shave biopsy procedure note, location(s): see above. After discussion of benefits and risks including but not limited to bleeding, infection, scar, incomplete removal, recurrence, and non-diagnostic biopsy, written consent and photographs were obtained. The area was cleaned with  isopropyl alcohol. 0.5mL of 1% lidocaine with epinephrine was injected to obtain adequate anesthesia of lesion(s). Shave biopsy at site(s) performed. Hemostasis was achieved with aluminium chloride. Petrolatum ointment and a sterile dressing were applied. The patient tolerated the procedure and no complications were noted. The patient was provided with verbal and written post care instructions.     - Cryotherapy procedure note, location(s): left cheek. After verbal consent and discussion of risks and benefits including, but not limited to, dyspigmentation/scar, blister, and pain, 2 lesion(s) was(were) treated with 1-2 mm freeze border for 1-2 cycles with liquid nitrogen. Post cryotherapy instructions were provided.     Follow-up: 1 year(s) in-person, or earlier for new or changing lesions    Staff:     All risks, benefits and alternatives were discussed with patient.  Patient is in agreement and understands the assessment and plan.  All questions were answered.    Leilani Galicia PA-C, MPAS  [facility] [place]: Phone: [phone], Fax: [phone]  [facility]: Phone: [phone],  Fax: [phone]  [facility]: Phone: [phone], Fax: [phone]  ____________________________________________    CC: Derm Problem (Concerned about spot on L cheek and neck)      HPI:  Mr. Herminio Wesley is a 72 year old male who presents today as a new patient for spot on the L cheek and neck. No hx skin cancer.     Patient is otherwise feeling well, without additional concerns.    Labs:  none    Physical Exam:  Vitals: There were no vitals taken for this visit.  SKIN: Sun-exposed skin, which includes the head/face, neck, both arms, digits, and/or nails was examined.    - L posterior neck - 5mm pink scaly papule  - L anterior thigh - 6mm pink shiny papule  - There are erythematous macules with overyling adherent scale on the L  "cheek x 2  - skin is tanned, Dequan II.   - Scattered brown macules on sun exposed areas..   - No other lesions of concern on areas examined.                Medications:  Current Outpatient Medications   Medication     amLODIPine-benazepril (LOTREL) 5-20 MG capsule     aspirin 81 MG tablet     Coenzyme Q10 (CO Q 10 PO)     glucosamine-chondroitinoitin 8464-2835 MG/30ML LIQD     ibuprofen (ADVIL,MOTRIN) 200 MG tablet     simvastatin (ZOCOR) 20 MG tablet     No current facility-administered medications for this visit.      Past Medical/Surgical History:   Patient Active Problem List   Diagnosis     Preventive measure     Hyperlipidemia LDL goal <100     Essential hypertension with goal blood pressure less than 130/80     Prostate enlargement     History of UTI     Advanced directives, counseling/discussion (ACP)     Blood donor     Iron deficiency anemia due to chronic blood loss     Snoring     History of amaurosis fugax     Elevated prostate specific antigen (PSA)     Past Medical History:   Diagnosis Date     Amaurosis fugax of left eye 7/11/2013    In 2006; Carotid US mild plaque; PERNELL neg except for \"small aortic valve strands\" of uncertain significance; no further sx. He continue ASA 81 mg per day with a statin and BP meds     High cholesterol      Hypertension                           Again, thank you for allowing me to participate in the care of your patient.        Sincerely,        Leilani Galicia PA-C    "

## 2023-07-07 LAB
PATH REPORT.COMMENTS IMP SPEC: NORMAL
PATH REPORT.FINAL DX SPEC: NORMAL
PATH REPORT.GROSS SPEC: NORMAL
PATH REPORT.MICROSCOPIC SPEC OTHER STN: NORMAL
PATH REPORT.RELEVANT HX SPEC: NORMAL

## 2023-07-07 RX ORDER — AMLODIPINE AND BENAZEPRIL HYDROCHLORIDE 5; 20 MG/1; MG/1
1 CAPSULE ORAL 2 TIMES DAILY
Qty: 180 CAPSULE | Refills: 1 | Status: SHIPPED | OUTPATIENT
Start: 2023-07-07 | End: 2023-11-20

## 2023-07-07 RX ORDER — SIMVASTATIN 20 MG
20 TABLET ORAL AT BEDTIME
Qty: 90 TABLET | Refills: 3 | Status: SHIPPED | OUTPATIENT
Start: 2023-07-07 | End: 2024-07-25

## 2023-11-01 ENCOUNTER — OFFICE VISIT (OUTPATIENT)
Dept: FAMILY MEDICINE | Facility: CLINIC | Age: 73
End: 2023-11-01

## 2023-11-01 VITALS
SYSTOLIC BLOOD PRESSURE: 123 MMHG | OXYGEN SATURATION: 99 % | BODY MASS INDEX: 28.01 KG/M2 | WEIGHT: 199.4 LBS | DIASTOLIC BLOOD PRESSURE: 73 MMHG | HEART RATE: 72 BPM

## 2023-11-01 DIAGNOSIS — E78.5 HYPERLIPIDEMIA LDL GOAL <100: ICD-10-CM

## 2023-11-01 DIAGNOSIS — I10 ESSENTIAL HYPERTENSION WITH GOAL BLOOD PRESSURE LESS THAN 130/80: ICD-10-CM

## 2023-11-01 DIAGNOSIS — Z00.00 ENCOUNTER FOR MEDICARE ANNUAL WELLNESS EXAM: Primary | ICD-10-CM

## 2023-11-01 LAB
ALBUMIN SERPL BCG-MCNC: 4.6 G/DL (ref 3.5–5.2)
ALP SERPL-CCNC: 60 U/L (ref 40–129)
ALT SERPL W P-5'-P-CCNC: 20 U/L (ref 0–70)
ANION GAP SERPL CALCULATED.3IONS-SCNC: 11 MMOL/L (ref 7–15)
AST SERPL W P-5'-P-CCNC: 20 U/L (ref 0–45)
BILIRUB SERPL-MCNC: 0.5 MG/DL
BUN SERPL-MCNC: 15 MG/DL (ref 8–23)
CALCIUM SERPL-MCNC: 9.8 MG/DL (ref 8.8–10.2)
CHLORIDE SERPL-SCNC: 99 MMOL/L (ref 98–107)
CHOLEST SERPL-MCNC: 174 MG/DL
CREAT SERPL-MCNC: 0.9 MG/DL (ref 0.67–1.17)
DEPRECATED HCO3 PLAS-SCNC: 25 MMOL/L (ref 22–29)
EGFRCR SERPLBLD CKD-EPI 2021: 90 ML/MIN/1.73M2
GLUCOSE SERPL-MCNC: 107 MG/DL (ref 70–99)
HDLC SERPL-MCNC: 87 MG/DL
LDLC SERPL CALC-MCNC: 78 MG/DL
NONHDLC SERPL-MCNC: 87 MG/DL
POTASSIUM SERPL-SCNC: 5.1 MMOL/L (ref 3.4–5.3)
PROT SERPL-MCNC: 7.9 G/DL (ref 6.4–8.3)
SODIUM SERPL-SCNC: 135 MMOL/L (ref 135–145)
TRIGL SERPL-MCNC: 45 MG/DL

## 2023-11-01 PROCEDURE — G0439 PPPS, SUBSEQ VISIT: HCPCS | Performed by: FAMILY MEDICINE

## 2023-11-01 PROCEDURE — G0008 ADMIN INFLUENZA VIRUS VAC: HCPCS | Mod: 59 | Performed by: FAMILY MEDICINE

## 2023-11-01 PROCEDURE — 80053 COMPREHEN METABOLIC PANEL: CPT | Performed by: FAMILY MEDICINE

## 2023-11-01 PROCEDURE — 90694 VACC AIIV4 NO PRSRV 0.5ML IM: CPT | Performed by: FAMILY MEDICINE

## 2023-11-01 PROCEDURE — 80061 LIPID PANEL: CPT | Mod: QW | Performed by: FAMILY MEDICINE

## 2023-11-01 PROCEDURE — 36415 COLL VENOUS BLD VENIPUNCTURE: CPT | Performed by: FAMILY MEDICINE

## 2023-11-01 PROCEDURE — 99214 OFFICE O/P EST MOD 30 MIN: CPT | Mod: 25 | Performed by: FAMILY MEDICINE

## 2023-11-01 RX ORDER — VALACYCLOVIR HYDROCHLORIDE 1 G/1
1 TABLET, FILM COATED ORAL 3 TIMES DAILY PRN
COMMUNITY
Start: 2023-05-08

## 2023-11-01 NOTE — PATIENT INSTRUCTIONS
Patient Education   Personalized Prevention Plan  You are due for the preventive services outlined below.  Your care team is available to assist you in scheduling these services.  If you have already completed any of these items, please share that information with your care team to update in your medical record.  Health Maintenance Due   Topic Date Due     COVID-19 Vaccine (1) Never done     RSV VACCINE 60+ (1 - 1-dose 60+ series) Never done     Flu Vaccine (1) 09/01/2023     FALL RISK ASSESSMENT  11/15/2023

## 2023-11-20 DIAGNOSIS — I10 ESSENTIAL HYPERTENSION WITH GOAL BLOOD PRESSURE LESS THAN 130/80: ICD-10-CM

## 2023-11-20 NOTE — TELEPHONE ENCOUNTER
Amlodipine benazepril 5-20mg-  Twice daily-[facility] [address]   pt BIBA c/o left foot infection sent from podiatrist [facility]

## 2023-11-21 NOTE — TELEPHONE ENCOUNTER
Med: Amlodipine-benazepril 5-20 mg      LOV (related): 11/1/23    Last Lab: 11/1/23      Due for F/U around: Return in about 53 weeks (around 11/6/2024) for Annual Wellness Visit.     Next Appt: None           BP Readings from Last 3 Encounters:   11/01/23 123/73   11/15/22 (!) 147/76   10/22/19 (!) 140/76       Last Comprehensive Metabolic Panel:  Lab Results   Component Value Date     11/01/2023    POTASSIUM 5.1 11/01/2023    CHLORIDE 99 11/01/2023    CO2 25 11/01/2023    ANIONGAP 11 11/01/2023     (H) 11/01/2023    BUN 15.0 11/01/2023    CR 0.90 11/01/2023    GFRESTIMATED 90 11/01/2023    YANY 9.8 11/01/2023

## 2023-11-22 RX ORDER — AMLODIPINE AND BENAZEPRIL HYDROCHLORIDE 5; 20 MG/1; MG/1
1 CAPSULE ORAL 2 TIMES DAILY
Qty: 180 CAPSULE | Refills: 3 | Status: SHIPPED | OUTPATIENT
Start: 2023-11-22

## 2024-07-25 DIAGNOSIS — E78.5 HYPERLIPIDEMIA LDL GOAL <100: ICD-10-CM

## 2024-07-25 RX ORDER — SIMVASTATIN 20 MG
20 TABLET ORAL AT BEDTIME
Qty: 90 TABLET | Refills: 0 | Status: SHIPPED | OUTPATIENT
Start: 2024-07-25

## 2024-10-18 DIAGNOSIS — E78.5 HYPERLIPIDEMIA LDL GOAL <100: ICD-10-CM

## 2024-10-18 RX ORDER — SIMVASTATIN 20 MG
20 TABLET ORAL AT BEDTIME
Qty: 90 TABLET | Refills: 0 | Status: SHIPPED | OUTPATIENT
Start: 2024-10-18 | End: 2024-11-11

## 2024-10-18 NOTE — TELEPHONE ENCOUNTER
Med: Simvastatin    Last refill 7/25/24 #90     LOV (related): 11/1/23-cpx    Last Lab: 11/1/23      Due for F/U around:  due for CPX      Next Appt: No current future appointments scheduled at [facility]         Cholesterol   Date Value Ref Range Status   11/01/2023 174 <200 mg/dL Final   11/15/2022 152 100 - 199 mg/dl Final   11/10/2020 161 <200 mg/dL Final   10/22/2019 186 <200 mg/dL Final     HDL Cholesterol   Date Value Ref Range Status   11/10/2020 69 >39 mg/dL Final   10/22/2019 84 >39 mg/dL Final     HDL   Date Value Ref Range Status   11/15/2022 50 40 mg/dl Final     Direct Measure HDL   Date Value Ref Range Status   11/01/2023 87 >=40 mg/dL Final     LDL Cholesterol Calculated   Date Value Ref Range Status   11/01/2023 78 <=100 mg/dL Final   11/10/2020 77 <100 mg/dL Final     Comment:     Desirable:       <100 mg/dl   10/22/2019 90 <100 mg/dL Final     Comment:     Desirable:       <100 mg/dl     LDL CALCULATED ([facility])   Date Value Ref Range Status   11/15/2022 79 0 - 130 mg/dl Final     Triglycerides   Date Value Ref Range Status   11/01/2023 45 <150 mg/dL Final   11/15/2022 120 0 - 149 mg/dl Final   11/10/2020 75 <150 mg/dL Final     Comment:     Fasting specimen   10/22/2019 62 <150 mg/dL Final     Comment:     Fasting specimen     Cholesterol/HDL Ratio   Date Value Ref Range Status   07/21/2015 2.3 0.0 - 5.0 Final   07/15/2014 2.2 0.0 - 5.0 Final

## 2024-11-05 SDOH — HEALTH STABILITY: PHYSICAL HEALTH: ON AVERAGE, HOW MANY MINUTES DO YOU ENGAGE IN EXERCISE AT THIS LEVEL?: 30 MIN

## 2024-11-05 SDOH — HEALTH STABILITY: PHYSICAL HEALTH: ON AVERAGE, HOW MANY DAYS PER WEEK DO YOU ENGAGE IN MODERATE TO STRENUOUS EXERCISE (LIKE A BRISK WALK)?: 3 DAYS

## 2024-11-05 ASSESSMENT — SOCIAL DETERMINANTS OF HEALTH (SDOH): HOW OFTEN DO YOU GET TOGETHER WITH FRIENDS OR RELATIVES?: ONCE A WEEK

## 2024-11-11 NOTE — PROGRESS NOTES
Preventive Care Visit  [facility]  CHITRA Azar CNP, Family Medicine  Nov 12, 2024      Assessment & Plan     Encounter for Medicare annual wellness exam  Age-appropriate preventative health maintenance along with diet, exercise and healthy weight discussed.     Essential hypertension with goal blood pressure less than 130/80  Taking Amlodipine-benazepril 5-20 mg BID. Stable/controlled. Continue current medication regimen.  - VENOUS COLLECTION  - amLODIPine-benazepril (LOTREL) 5-20 MG capsule  Dispense: 180 capsule; Refill: 3  - Comprehensive metabolic panel  - Comprehensive metabolic panel    Hyperlipidemia LDL goal <100  Taking Simvastatin 20 mg daily. Stable/controlled. Continue current medication regimen.  - VENOUS COLLECTION  - Lipid Profile ([facility])  - simvastatin (ZOCOR) 20 MG tablet  Dispense: 90 tablet; Refill: 3    Needs flu shot  - INFLUENZA VACCINE, TRIVALENT 65+ (FLUAD)    Screening for malignant neoplasm of colon  - Colonoscopy Screening  Referral      Patient has been advised of split billing requirements and indicates understanding: Yes        Counseling  Appropriate preventive services were addressed with this patient via screening, questionnaire, or discussion as appropriate for fall prevention, nutrition, physical activity, Tobacco-use cessation, social engagement, weight loss and cognition.  Checklist reviewing preventive services available has been given to the patient.  Reviewed patient's diet, addressing concerns and/or questions.   He is at risk for lack of exercise and has been provided with information to increase physical activity for the benefit of his well-being.       Work on weight loss  Regular exercise  See Patient Instructions    No follow-ups on file.    Beronica Durham is a 74 year old, presenting for the following:  Physical (Fasting/No concerns)            HPI    HTN: Taking amlodipine-benazepril 5-20 mg BID.  Home readings all at goal ranging from  104 to 130/ 59-79.  No chest pain or shortness of breath.      HLD: Taking Simvastatin 20 mg. No concerns.     [place] in the dubon, not this winter as his mother is over 100 years old.        Health Care Directive  Patient does not have a Health Care Directive: Patient states has Advance Directive and will bring in a copy to clinic.      11/5/2024   General Health   How would you rate your overall physical health? Excellent   Feel stress (tense, anxious, or unable to sleep) Not at all            11/5/2024   Nutrition   Diet: Regular (no restrictions)            11/5/2024   Exercise   Days per week of moderate/strenous exercise 3 days   Average minutes spent exercising at this level 30 min            11/5/2024   Social Factors   Frequency of gathering with friends or relatives Once a week   Worry food won't last until get money to buy more No   Food not last or not have enough money for food? No   Do you have housing? (Housing is defined as stable permanent housing and does not include staying ouside in a car, in a tent, in an abandoned building, in an overnight shelter, or couch-surfing.) Yes   Are you worried about losing your housing? No   Lack of transportation? No   Unable to get utilities (heat,electricity)? No            11/5/2024   Fall Risk   Fallen 2 or more times in the past year? No    Trouble with walking or balance? No        Patient-reported          11/5/2024   Activities of Daily Living- Home Safety   Needs help with the following daily activites None of the above   Safety concerns in the home None of the above            11/5/2024   Dental   Dentist two times every year? Yes            11/5/2024   Hearing Screening   Hearing concerns? None of the above   Hearing    Left:  500Hz: Pass  1000Hz: Pass  2000Hz: Fail  4000Hz: Fail    Right:  500Hz: Fail  1000Hz: Pass  2000Hz: Fail  4000Hz: Fail     Reviewed hearing results with patient. May consider hearing evaluation at [facility].         11/5/2024  "  Driving Risk Screening   Patient/family members have concerns about driving No            11/5/2024   General Alertness/Fatigue Screening   Have you been more tired than usual lately? No            11/5/2024   Urinary Incontinence Screening   Bothered by leaking urine in past 6 months No            11/5/2024   TB Screening   Were you born outside of the [place]? No              Today's PHQ-2 Score:       11/12/2024    10:18 AM   PHQ-2 ( 1999 [employer])   Q1: Little interest or pleasure in doing things 0   Q2: Feeling down, depressed or hopeless 0   PHQ-2 Score 0         11/5/2024   Substance Use   Alcohol more than 3/day or more than 7/wk No   Do you have a current opioid prescription? No   How severe/bad is pain from 1 to 10? 0/10 (No Pain)   Do you use any other substances recreationally? No        Social History     Tobacco Use    Smoking status: Never    Smokeless tobacco: Never   Substance Use Topics    Alcohol use: Yes     Comment: 1-2 beers in the evening    Drug use: No           11/5/2024   AAA Screening   Family history of Abdominal Aortic Aneurysm (AAA)? No      ASCVD Risk   The ASCVD Risk score (Donya HANSEN, et al., 2019) failed to calculate for the following reasons:    The systolic blood pressure is missing          Reviewed and updated as needed this visit by Provider   Tobacco  Allergies  Meds  Problems  Med Hx  Surg Hx  Fam Hx            Past Medical History:   Diagnosis Date    Amaurosis fugax of left eye 7/11/2013    In 2006; Carotid US mild plaque; PERNELL neg except for \"small aortic valve strands\" of uncertain significance; no further sx. He continue ASA 81 mg per day with a statin and BP meds    High cholesterol     Hypertension      Past Surgical History:   Procedure Laterality Date    ENT SURGERY      tonsillectomy     Lab work is in process  Current providers sharing in care for this patient include:  Patient Care Team:  Hamilton August MD as PCP - General (Family " "Medicine)  Hamilton August MD as Assigned PCP  Leilani Galicia PA-C as Physician Assistant (Dermatology)  Leilani Galicia PA-C as Assigned Surgical Provider    The following health maintenance items are reviewed in Epic and correct as of today:  Health Maintenance   Topic Date Due    COLORECTAL CANCER SCREENING  05/22/2024    COVID-19 Vaccine (1 - 2024-25 season) Never done    RSV VACCINE (1 - 1-dose 75+ series) Never done    BMP  11/01/2024    MEDICARE ANNUAL WELLNESS VISIT  11/12/2025    LIPID  11/12/2025    FALL RISK ASSESSMENT  11/12/2025    GLUCOSE  11/01/2026    ADVANCE CARE PLANNING  11/12/2029    DTAP/TDAP/TD IMMUNIZATION (3 - Td or Tdap) 07/28/2033    HEPATITIS C SCREENING  Completed    PHQ-2 (once per calendar year)  Completed    INFLUENZA VACCINE  Completed    Pneumococcal Vaccine: 65+ Years  Completed    ZOSTER IMMUNIZATION  Completed    HPV IMMUNIZATION  Aged Out    MENINGITIS IMMUNIZATION  Aged Out    RSV MONOCLONAL ANTIBODY  Aged Out         Review of Systems  Constitutional, HEENT, cardiovascular, pulmonary, GI, , musculoskeletal, neuro, skin, endocrine and psych systems are negative, except as otherwise noted.     Objective    Exam  There were no vitals taken for this visit.   Estimated body mass index is 28.01 kg/m  as calculated from the following:    Height as of 11/15/22: 1.797 m (5' 10.75\").    Weight as of 11/1/23: 90.4 kg (199 lb 6.4 oz).    Physical Exam  GENERAL: alert and no distress  EYES: Eyes grossly normal to inspection, PERRL and conjunctivae and sclerae normal  HENT: ear canals and TM's normal, nose and mouth without ulcers or lesions  NECK: no adenopathy, no asymmetry, masses, or scars  RESP: lungs clear to auscultation - no rales, rhonchi or wheezes  CV: regular rate and rhythm, normal S1 S2, no S3 or S4, no murmur, click or rub, no peripheral edema  ABDOMEN: soft, nontender, no hepatosplenomegaly, no masses and bowel sounds normal  MS: no gross musculoskeletal defects " noted, no edema  SKIN: no suspicious lesions or rashes  NEURO: Normal strength and tone, mentation intact and speech normal  PSYCH: mentation appears normal, affect normal/bright         11/12/2024   Mini Cog   Clock Draw Score 2 Normal   3 Item Recall 3 objects recalled   Mini Cog Total Score 5                 Signed Electronically by: CHITRA Azar CNP

## 2024-11-11 NOTE — PATIENT INSTRUCTIONS
Patient Education   Preventive Care Advice   This is general advice given by our system to help you stay healthy. However, your care team may have specific advice just for you. Please talk to your care team about your preventive care needs.  Nutrition  Eat 5 or more servings of fruits and vegetables each day.  Try wheat bread, brown rice and whole grain pasta (instead of white bread, rice, and pasta).  Get enough calcium and vitamin D. Check the label on foods and aim for 100% of the RDA (recommended daily allowance).  Lifestyle  Exercise at least 150 minutes each week  (30 minutes a day, 5 days a week).  Do muscle strengthening activities 2 days a week. These help control your weight and prevent disease.  No smoking.  Wear sunscreen to prevent skin cancer.  Have a dental exam and cleaning every 6 months.  Yearly exams  See your health care team every year to talk about:  Any changes in your health.  Any medicines your care team has prescribed.  Preventive care, family planning, and ways to prevent chronic diseases.  Shots (vaccines)   HPV shots (up to age 26), if you've never had them before.  Hepatitis B shots (up to age 59), if you've never had them before.  COVID-19 shot: Get this shot when it's due.  Flu shot: Get a flu shot every year.  Tetanus shot: Get a tetanus shot every 10 years.  Pneumococcal, hepatitis A, and RSV shots: Ask your care team if you need these based on your risk.  Shingles shot (for age 50 and up)  General health tests  Diabetes screening:  Starting at age 35, Get screened for diabetes at least every 3 years.  If you are younger than age 35, ask your care team if you should be screened for diabetes.  Cholesterol test: At age 39, start having a cholesterol test every 5 years, or more often if advised.  Bone density scan (DEXA): At age 50, ask your care team if you should have this scan for osteoporosis (brittle bones).  Hepatitis C: Get tested at least once in your life.  STIs (sexually  transmitted infections)  Before age 24: Ask your care team if you should be screened for STIs.  After age 24: Get screened for STIs if you're at risk. You are at risk for STIs (including HIV) if:  You are sexually active with more than one person.  You don't use condoms every time.  You or a partner was diagnosed with a sexually transmitted infection.  If you are at risk for HIV, ask about PrEP medicine to prevent HIV.  Get tested for HIV at least once in your life, whether you are at risk for HIV or not.  Cancer screening tests  Cervical cancer screening: If you have a cervix, begin getting regular cervical cancer screening tests starting at age 21.  Breast cancer scan (mammogram): If you've ever had breasts, begin having regular mammograms starting at age 40. This is a scan to check for breast cancer.  Colon cancer screening: It is important to start screening for colon cancer at age 45.  Have a colonoscopy test every 10 years (or more often if you're at risk) Or, ask your provider about stool tests like a FIT test every year or Cologuard test every 3 years.  To learn more about your testing options, visit:   .  For help making a decision, visit:   https://bit.ly/rp21543.  Prostate cancer screening test: If you have a prostate, ask your care team if a prostate cancer screening test (PSA) at age 55 is right for you.  Lung cancer screening: If you are a current or former smoker ages 50 to 80, ask your care team if ongoing lung cancer screenings are right for you.  For informational purposes only. Not to replace the advice of your health care provider. Copyright   2023 [employer]. All rights reserved. Clinically reviewed by the [facility] Transitions Program. [employer] [id number] - REV 01/24.

## 2024-11-12 ENCOUNTER — OFFICE VISIT (OUTPATIENT)
Dept: FAMILY MEDICINE | Facility: CLINIC | Age: 74
End: 2024-11-12

## 2024-11-12 DIAGNOSIS — Z12.11 SCREENING FOR MALIGNANT NEOPLASM OF COLON: ICD-10-CM

## 2024-11-12 DIAGNOSIS — I10 ESSENTIAL HYPERTENSION WITH GOAL BLOOD PRESSURE LESS THAN 130/80: ICD-10-CM

## 2024-11-12 DIAGNOSIS — Z00.00 ENCOUNTER FOR MEDICARE ANNUAL WELLNESS EXAM: Primary | ICD-10-CM

## 2024-11-12 DIAGNOSIS — E78.5 HYPERLIPIDEMIA LDL GOAL <100: ICD-10-CM

## 2024-11-12 DIAGNOSIS — Z23 NEEDS FLU SHOT: ICD-10-CM

## 2024-11-12 LAB
ALBUMIN SERPL BCG-MCNC: 4.6 G/DL (ref 3.5–5.2)
ALP SERPL-CCNC: 67 U/L (ref 40–150)
ALT SERPL W P-5'-P-CCNC: 16 U/L (ref 0–70)
ANION GAP SERPL CALCULATED.3IONS-SCNC: 9 MMOL/L (ref 7–15)
AST SERPL W P-5'-P-CCNC: 15 U/L (ref 0–45)
BILIRUB SERPL-MCNC: 0.5 MG/DL
BUN SERPL-MCNC: 11.2 MG/DL (ref 8–23)
CALCIUM SERPL-MCNC: 9.6 MG/DL (ref 8.8–10.4)
CHLORIDE SERPL-SCNC: 99 MMOL/L (ref 98–107)
CHOLESTEROL: 168 MG/DL (ref 100–199)
CREAT SERPL-MCNC: 0.8 MG/DL (ref 0.67–1.17)
EGFRCR SERPLBLD CKD-EPI 2021: >90 ML/MIN/1.73M2
FASTING STATUS PATIENT QL REPORTED: YES
FASTING?: YES
GLUCOSE SERPL-MCNC: 102 MG/DL (ref 70–99)
HCO3 SERPL-SCNC: 27 MMOL/L (ref 22–29)
HDL (RMG): 83 MG/DL (ref 40–?)
LDL CALCULATED (RMG): 74 MG/DL (ref 0–130)
POTASSIUM SERPL-SCNC: 4.6 MMOL/L (ref 3.4–5.3)
PROT SERPL-MCNC: 7.7 G/DL (ref 6.4–8.3)
SODIUM SERPL-SCNC: 135 MMOL/L (ref 135–145)
TRIGLYCERIDES (RMG): 52 MG/DL (ref 0–149)

## 2024-11-12 PROCEDURE — 80053 COMPREHEN METABOLIC PANEL: CPT

## 2024-11-12 PROCEDURE — G0439 PPPS, SUBSEQ VISIT: HCPCS

## 2024-11-12 PROCEDURE — 99213 OFFICE O/P EST LOW 20 MIN: CPT | Mod: 25

## 2024-11-12 PROCEDURE — 36415 COLL VENOUS BLD VENIPUNCTURE: CPT

## 2024-11-12 PROCEDURE — 80061 LIPID PANEL: CPT | Mod: QW

## 2024-11-12 PROCEDURE — G0008 ADMIN INFLUENZA VIRUS VAC: HCPCS

## 2024-11-12 PROCEDURE — 90653 IIV ADJUVANT VACCINE IM: CPT

## 2024-11-12 RX ORDER — SIMVASTATIN 20 MG
20 TABLET ORAL AT BEDTIME
Qty: 90 TABLET | Refills: 3 | Status: SHIPPED | OUTPATIENT
Start: 2024-11-12

## 2024-11-12 RX ORDER — AMLODIPINE AND BENAZEPRIL HYDROCHLORIDE 5; 20 MG/1; MG/1
1 CAPSULE ORAL 2 TIMES DAILY
Qty: 180 CAPSULE | Refills: 3 | Status: SHIPPED | OUTPATIENT
Start: 2024-11-12

## 2025-01-23 ENCOUNTER — TRANSFERRED RECORDS (OUTPATIENT)
Dept: FAMILY MEDICINE | Facility: CLINIC | Age: 75
End: 2025-01-23

## 2025-01-27 PROBLEM — D12.6 ADENOMATOUS POLYP OF COLON: Status: ACTIVE | Noted: 2025-01-27

## 2025-01-28 ENCOUNTER — PATIENT OUTREACH (OUTPATIENT)
Dept: GASTROENTEROLOGY | Facility: CLINIC | Age: 75
End: 2025-01-28
Payer: COMMERCIAL

## 2025-07-22 NOTE — PROGRESS NOTES
"Herminio Wesley is a 70 year old male who is being evaluated via a billable video visit.      The patient has been notified of following:     \"This video visit will be conducted via a call between you and your physician/provider. We have found that certain health care needs can be provided without the need for an in-person physical exam.  This service lets us provide the care you need with a video conversation.  If a prescription is necessary we can send it directly to your pharmacy.  If lab work is needed we can place an order for that and you can then stop by our lab to have the test done at a later time.    Video visits are billed at different rates depending on your insurance coverage.  Please reach out to your insurance provider with any questions.    If during the course of the call the physician/provider feels a video visit is not appropriate, you will not be charged for this service.\"    Patient has given verbal consent for Video visit? Yes  How would you like to obtain your AVS? [facility]  If you are dropped from the video visit, the video invite should be resent to: Other e-mail: [email or web address]  Will anyone else be joining your video visit? No      Subjective     Herminio Wesley is a 70 year old male who presents today via video visit for the following health issues:    HPI     Hyperlipidemia Follow-Up      Are you regularly taking any medication or supplement to lower your cholesterol?   Yes- simvastatin    Are you having muscle aches or other side effects that you think could be caused by your cholesterol lowering medication?  No    Hypertension Follow-up      Do you check your blood pressure regularly outside of the clinic? Yes     Are you following a low salt diet? Yes    Are your blood pressures ever more than 140 on the top number (systolic) OR more   than 90 on the bottom number (diastolic), for example 140/90? Yes, last month      How many servings of fruits and vegetables do you eat daily?  0-1    On average, " No care due was identified.  [facility] Embedded Care Due Messages. Reference number: [id number].   7/22/2025 12:18:55 AM CDT   how many sweetened beverages do you drink each day (Examples: soda, juice, sweet tea, etc.  Do NOT count diet or artificially sweetened beverages)?   0    How many days per week do you exercise enough to make your heart beat faster? 3 or less    How many minutes a day do you exercise enough to make your heart beat faster? 20 - 29    How many days per week do you miss taking your medication? 0         Video Start Time: 9:57 AM                                Home blood pressures are usually under control.                            Leaving for [place] in a few weeks.              Has been trying to lose some weight, is down to 189 pounds.            Donates blood 3 times per year.               Has some iron tablets, but does not take them very often.                 He increased his co-Q10 to 200 mg/day, and states that has helped with muscle soreness.                     Current Outpatient Medications   Medication Sig Dispense Refill     amLODIPine-benazepril (LOTREL) 5-20 MG capsule TAKE ONE CAPSULE BY MOUTH ONCE DAILY 90 capsule 4     aspirin 81 MG tablet Take 1 tablet by mouth daily.       Coenzyme Q10 (CO Q 10 PO) Take 1 tablet by mouth daily 200mg       ibuprofen (ADVIL,MOTRIN) 200 MG tablet Take 400 mg by mouth every 4 hours as needed for mild pain       simvastatin (ZOCOR) 20 MG tablet Take 1 tablet (20 mg) by mouth At Bedtime 90 tablet 4       Review of Systems   CONSTITUTIONAL:NEGATIVE for fever, chills, change in weight  RESP:NEGATIVE for significant cough or SOB  CV: NEGATIVE for chest pain, palpitations or peripheral edema      Objective           Vitals:  No vitals were obtained today due to virtual visit.    Physical Exam     GENERAL: Healthy, alert and no distress  EYES: Eyes grossly normal to inspection.  No discharge or erythema, or obvious scleral/conjunctival abnormalities.  RESP: No audible wheeze, cough, or visible cyanosis.  No visible retractions or increased work of breathing.    SKIN:  Visible skin clear. No significant rash, abnormal pigmentation or lesions.  NEURO: Cranial nerves grossly intact.  Mentation and speech appropriate for age.  PSYCH: Mentation appears normal, affect normal/bright, judgement and insight intact, normal speech and appearance well-groomed.              Assessment & Plan     Herminio was seen today for recheck medication.    Diagnoses and all orders for this visit:    Essential hypertension with goal blood pressure less than 130/80  -     Comprehensive metabolic panel (BMP + Alb, Alk Phos, ALT, AST, Total. Bili, TP); Future  -     amLODIPine-benazepril (LOTREL) 5-20 MG capsule; TAKE ONE CAPSULE BY MOUTH ONCE DAILY    Hyperlipidemia LDL goal <100  -     Comprehensive metabolic panel (BMP + Alb, Alk Phos, ALT, AST, Total. Bili, TP); Future  -     Lipid Profile (Chol, Trig, HDL, LDL calc); Future  -     simvastatin (ZOCOR) 20 MG tablet; Take 1 tablet (20 mg) by mouth At Bedtime    Blood donor                      Summary and implications:  This patient is doing well based on symptoms, gets plenty of exercise, and monitors his home blood pressure.                    He plans on returning for fasting lab work.  Patient Instructions   I will let you know your lab results.      As we discussed, take the iron every other day for 4 weeks around the time of each blood donation.               Return in about 6 months (around 5/4/2021) for BP Recheck.    Adeel Walker MD  [facility]      Video-Visit Details    Type of service:  Video Visit    Video End Time:10:09 AM    Originating Location (pt. Location): Home    Distant Location (provider location):  [facility]     Platform used for Video Visit: Salvatore    Recent Results (from the past 100 hour(s))   Lipid Profile (Chol, Trig, HDL, LDL calc)    Collection Time: 11/10/20  8:30 AM   Result Value Ref Range    Cholesterol 161 <200 mg/dL    Triglycerides 75 <150  mg/dL    HDL Cholesterol 69 >39 mg/dL    LDL Cholesterol Calculated 77 <100 mg/dL    Non HDL Cholesterol 92 <130 mg/dL   Comprehensive metabolic panel (BMP + Alb, Alk Phos, ALT, AST, Total. Bili, TP)    Collection Time: 11/10/20  8:30 AM   Result Value Ref Range    Sodium 138 133 - 144 mmol/L    Potassium 4.5 3.4 - 5.3 mmol/L    Chloride 107 94 - 109 mmol/L    Carbon Dioxide 27 20 - 32 mmol/L    Anion Gap 4 3 - 14 mmol/L    Glucose 110 (H) 70 - 99 mg/dL    Urea Nitrogen 18 7 - 30 mg/dL    Creatinine 0.86 0.66 - 1.25 mg/dL    GFR Estimate 87 >60 mL/min/[1.73_m2]    GFR Estimate If Black >90 >60 mL/min/[1.73_m2]    Calcium 8.6 8.5 - 10.1 mg/dL    Bilirubin Total 0.6 0.2 - 1.3 mg/dL    Albumin 3.6 3.4 - 5.0 g/dL    Protein Total 7.1 6.8 - 8.8 g/dL    Alkaline Phosphatase 57 40 - 150 U/L    ALT 24 0 - 70 U/L    AST 13 0 - 45 U/L         My chart message sent.       The cholesterol numbers are excellent.         The kidney and liver function and the potassium are normal.           Your blood sugar (glucose) is slightly elevated at 110.    Please work harder on restricting carbohydrates ( starches, sweets, etc).